# Patient Record
Sex: FEMALE | Race: OTHER | NOT HISPANIC OR LATINO | Employment: UNEMPLOYED | URBAN - METROPOLITAN AREA
[De-identification: names, ages, dates, MRNs, and addresses within clinical notes are randomized per-mention and may not be internally consistent; named-entity substitution may affect disease eponyms.]

---

## 2024-02-11 ENCOUNTER — APPOINTMENT (EMERGENCY)
Dept: CT IMAGING | Facility: HOSPITAL | Age: 34
DRG: 493 | End: 2024-02-11
Payer: COMMERCIAL

## 2024-02-11 ENCOUNTER — APPOINTMENT (EMERGENCY)
Dept: RADIOLOGY | Facility: HOSPITAL | Age: 34
DRG: 493 | End: 2024-02-11
Payer: COMMERCIAL

## 2024-02-11 ENCOUNTER — HOSPITAL ENCOUNTER (INPATIENT)
Facility: HOSPITAL | Age: 34
LOS: 3 days | Discharge: HOME | DRG: 493 | End: 2024-02-14
Attending: EMERGENCY MEDICINE | Admitting: INTERNAL MEDICINE
Payer: COMMERCIAL

## 2024-02-11 DIAGNOSIS — S82.201A CLOSED FRACTURE OF RIGHT TIBIA AND FIBULA, INITIAL ENCOUNTER: ICD-10-CM

## 2024-02-11 DIAGNOSIS — S82.401A CLOSED FRACTURE OF RIGHT TIBIA AND FIBULA, INITIAL ENCOUNTER: ICD-10-CM

## 2024-02-11 DIAGNOSIS — S82.231A CLOSED DISPLACED OBLIQUE FRACTURE OF SHAFT OF RIGHT TIBIA, INITIAL ENCOUNTER: Primary | ICD-10-CM

## 2024-02-11 PROBLEM — D72.823 LEUKEMOID REACTION: Status: ACTIVE | Noted: 2024-02-11

## 2024-02-11 LAB
ANION GAP SERPL CALCULATED.3IONS-SCNC: 12 MMOL/L
BASOPHILS # BLD AUTO: 0.04 THOUSANDS/ÂΜL (ref 0–0.1)
BASOPHILS NFR BLD AUTO: 0 % (ref 0–1)
BUN SERPL-MCNC: 20 MG/DL (ref 5–25)
CALCIUM SERPL-MCNC: 9.1 MG/DL (ref 8.4–10.2)
CHLORIDE SERPL-SCNC: 101 MMOL/L (ref 96–108)
CO2 SERPL-SCNC: 23 MMOL/L (ref 21–32)
CREAT SERPL-MCNC: 0.83 MG/DL (ref 0.6–1.3)
EOSINOPHIL # BLD AUTO: 0.01 THOUSAND/ÂΜL (ref 0–0.61)
EOSINOPHIL NFR BLD AUTO: 0 % (ref 0–6)
ERYTHROCYTE [DISTWIDTH] IN BLOOD BY AUTOMATED COUNT: 13.3 % (ref 11.6–15.1)
GFR SERPL CREATININE-BSD FRML MDRD: 93 ML/MIN/1.73SQ M
GLUCOSE SERPL-MCNC: 116 MG/DL (ref 65–140)
HCT VFR BLD AUTO: 35.7 % (ref 34.8–46.1)
HGB BLD-MCNC: 12.3 G/DL (ref 11.5–15.4)
IMM GRANULOCYTES # BLD AUTO: 0.06 THOUSAND/UL (ref 0–0.2)
IMM GRANULOCYTES NFR BLD AUTO: 0 % (ref 0–2)
LYMPHOCYTES # BLD AUTO: 2.73 THOUSANDS/ÂΜL (ref 0.6–4.47)
LYMPHOCYTES NFR BLD AUTO: 17 % (ref 14–44)
MCH RBC QN AUTO: 30 PG (ref 26.8–34.3)
MCHC RBC AUTO-ENTMCNC: 34.5 G/DL (ref 31.4–37.4)
MCV RBC AUTO: 87 FL (ref 82–98)
MONOCYTES # BLD AUTO: 0.91 THOUSAND/ÂΜL (ref 0.17–1.22)
MONOCYTES NFR BLD AUTO: 6 % (ref 4–12)
NEUTROPHILS # BLD AUTO: 12.02 THOUSANDS/ÂΜL (ref 1.85–7.62)
NEUTS SEG NFR BLD AUTO: 77 % (ref 43–75)
NRBC BLD AUTO-RTO: 0 /100 WBCS
PLATELET # BLD AUTO: 368 THOUSANDS/UL (ref 149–390)
PMV BLD AUTO: 9 FL (ref 8.9–12.7)
POTASSIUM SERPL-SCNC: 3.7 MMOL/L (ref 3.5–5.3)
PROCALCITONIN SERPL-MCNC: <0.05 NG/ML
RBC # BLD AUTO: 4.1 MILLION/UL (ref 3.81–5.12)
SODIUM SERPL-SCNC: 136 MMOL/L (ref 135–147)
WBC # BLD AUTO: 15.77 THOUSAND/UL (ref 4.31–10.16)

## 2024-02-11 PROCEDURE — 96374 THER/PROPH/DIAG INJ IV PUSH: CPT

## 2024-02-11 PROCEDURE — 99152 MOD SED SAME PHYS/QHP 5/>YRS: CPT | Performed by: EMERGENCY MEDICINE

## 2024-02-11 PROCEDURE — 29505 APPLICATION LONG LEG SPLINT: CPT | Performed by: EMERGENCY MEDICINE

## 2024-02-11 PROCEDURE — 99285 EMERGENCY DEPT VISIT HI MDM: CPT | Performed by: EMERGENCY MEDICINE

## 2024-02-11 PROCEDURE — 73700 CT LOWER EXTREMITY W/O DYE: CPT

## 2024-02-11 PROCEDURE — 85025 COMPLETE CBC W/AUTO DIFF WBC: CPT | Performed by: EMERGENCY MEDICINE

## 2024-02-11 PROCEDURE — 96376 TX/PRO/DX INJ SAME DRUG ADON: CPT

## 2024-02-11 PROCEDURE — 2W3LX1Z IMMOBILIZATION OF RIGHT LOWER EXTREMITY USING SPLINT: ICD-10-PCS | Performed by: EMERGENCY MEDICINE

## 2024-02-11 PROCEDURE — 99284 EMERGENCY DEPT VISIT MOD MDM: CPT

## 2024-02-11 PROCEDURE — 36415 COLL VENOUS BLD VENIPUNCTURE: CPT | Performed by: EMERGENCY MEDICINE

## 2024-02-11 PROCEDURE — 80048 BASIC METABOLIC PNL TOTAL CA: CPT | Performed by: EMERGENCY MEDICINE

## 2024-02-11 PROCEDURE — 73590 X-RAY EXAM OF LOWER LEG: CPT

## 2024-02-11 PROCEDURE — 99222 1ST HOSP IP/OBS MODERATE 55: CPT | Performed by: PHYSICIAN ASSISTANT

## 2024-02-11 PROCEDURE — 84145 PROCALCITONIN (PCT): CPT | Performed by: PHYSICIAN ASSISTANT

## 2024-02-11 RX ORDER — KETAMINE HCL IN NACL, ISO-OSM 100MG/10ML
15 SYRINGE (ML) INJECTION ONCE
Status: DISCONTINUED | OUTPATIENT
Start: 2024-02-11 | End: 2024-02-11

## 2024-02-11 RX ORDER — ACETAMINOPHEN 325 MG/1
650 TABLET ORAL EVERY 4 HOURS PRN
Status: DISCONTINUED | OUTPATIENT
Start: 2024-02-11 | End: 2024-02-14 | Stop reason: HOSPADM

## 2024-02-11 RX ORDER — FENTANYL CITRATE 50 UG/ML
25 INJECTION, SOLUTION INTRAMUSCULAR; INTRAVENOUS ONCE
Status: COMPLETED | OUTPATIENT
Start: 2024-02-11 | End: 2024-02-11

## 2024-02-11 RX ORDER — ACETAMINOPHEN 10 MG/ML
1000 INJECTION, SOLUTION INTRAVENOUS ONCE
Status: COMPLETED | OUTPATIENT
Start: 2024-02-11 | End: 2024-02-11

## 2024-02-11 RX ORDER — OXYCODONE HYDROCHLORIDE 10 MG/1
10 TABLET ORAL EVERY 4 HOURS PRN
Status: DISCONTINUED | OUTPATIENT
Start: 2024-02-11 | End: 2024-02-14 | Stop reason: HOSPADM

## 2024-02-11 RX ORDER — KETAMINE HCL IN NACL, ISO-OSM 100MG/10ML
25 SYRINGE (ML) INJECTION ONCE
Status: COMPLETED | OUTPATIENT
Start: 2024-02-11 | End: 2024-02-11

## 2024-02-11 RX ORDER — SODIUM CHLORIDE 9 MG/ML
125 INJECTION, SOLUTION INTRAVENOUS CONTINUOUS
Status: DISCONTINUED | OUTPATIENT
Start: 2024-02-12 | End: 2024-02-13

## 2024-02-11 RX ORDER — HYDROMORPHONE HCL/PF 1 MG/ML
1 SYRINGE (ML) INJECTION EVERY 4 HOURS PRN
Status: DISCONTINUED | OUTPATIENT
Start: 2024-02-11 | End: 2024-02-14 | Stop reason: HOSPADM

## 2024-02-11 RX ORDER — OXYCODONE HYDROCHLORIDE 5 MG/1
5 TABLET ORAL EVERY 4 HOURS PRN
Status: DISCONTINUED | OUTPATIENT
Start: 2024-02-11 | End: 2024-02-14 | Stop reason: HOSPADM

## 2024-02-11 RX ORDER — KETAMINE HCL IN NACL, ISO-OSM 100MG/10ML
10 SYRINGE (ML) INJECTION ONCE
Status: COMPLETED | OUTPATIENT
Start: 2024-02-11 | End: 2024-02-11

## 2024-02-11 RX ORDER — KETOROLAC TROMETHAMINE 30 MG/ML
15 INJECTION, SOLUTION INTRAMUSCULAR; INTRAVENOUS ONCE
Status: COMPLETED | OUTPATIENT
Start: 2024-02-11 | End: 2024-02-11

## 2024-02-11 RX ORDER — ONDANSETRON 2 MG/ML
4 INJECTION INTRAMUSCULAR; INTRAVENOUS EVERY 6 HOURS PRN
Status: DISCONTINUED | OUTPATIENT
Start: 2024-02-11 | End: 2024-02-14 | Stop reason: HOSPADM

## 2024-02-11 RX ORDER — HEPARIN SODIUM 5000 [USP'U]/ML
5000 INJECTION, SOLUTION INTRAVENOUS; SUBCUTANEOUS EVERY 8 HOURS SCHEDULED
Status: ACTIVE | OUTPATIENT
Start: 2024-02-11 | End: 2024-02-12

## 2024-02-11 RX ADMIN — SODIUM CHLORIDE 125 ML/HR: 0.9 INJECTION, SOLUTION INTRAVENOUS at 23:25

## 2024-02-11 RX ADMIN — KETOROLAC TROMETHAMINE 15 MG: 30 INJECTION, SOLUTION INTRAMUSCULAR at 21:28

## 2024-02-11 RX ADMIN — FENTANYL CITRATE 25 MCG: 50 INJECTION INTRAMUSCULAR; INTRAVENOUS at 20:04

## 2024-02-11 RX ADMIN — ACETAMINOPHEN 1000 MG: 10 INJECTION INTRAVENOUS at 21:28

## 2024-02-11 RX ADMIN — FENTANYL CITRATE 25 MCG: 50 INJECTION INTRAMUSCULAR; INTRAVENOUS at 19:26

## 2024-02-11 RX ADMIN — Medication 10 MG: at 21:50

## 2024-02-11 RX ADMIN — Medication 25 MG: at 21:34

## 2024-02-11 RX ADMIN — HYDROMORPHONE HYDROCHLORIDE 1 MG: 1 INJECTION, SOLUTION INTRAMUSCULAR; INTRAVENOUS; SUBCUTANEOUS at 23:44

## 2024-02-11 RX ADMIN — MORPHINE SULFATE 2 MG: 2 INJECTION, SOLUTION INTRAMUSCULAR; INTRAVENOUS at 23:00

## 2024-02-11 RX ADMIN — OXYCODONE HYDROCHLORIDE 10 MG: 10 TABLET ORAL at 21:02

## 2024-02-12 ENCOUNTER — APPOINTMENT (INPATIENT)
Dept: RADIOLOGY | Facility: HOSPITAL | Age: 34
DRG: 493 | End: 2024-02-12
Payer: COMMERCIAL

## 2024-02-12 ENCOUNTER — ANESTHESIA (INPATIENT)
Dept: PERIOP | Facility: HOSPITAL | Age: 34
DRG: 493 | End: 2024-02-12
Payer: COMMERCIAL

## 2024-02-12 ENCOUNTER — ANESTHESIA EVENT (INPATIENT)
Dept: PERIOP | Facility: HOSPITAL | Age: 34
DRG: 493 | End: 2024-02-12
Payer: COMMERCIAL

## 2024-02-12 PROBLEM — D64.9 ANEMIA: Status: ACTIVE | Noted: 2024-02-12

## 2024-02-12 LAB
ANION GAP SERPL CALCULATED.3IONS-SCNC: 7 MMOL/L
APTT PPP: 35 SECONDS (ref 23–37)
BASOPHILS # BLD AUTO: 0.01 THOUSANDS/ÂΜL (ref 0–0.1)
BASOPHILS NFR BLD AUTO: 0 % (ref 0–1)
BUN SERPL-MCNC: 15 MG/DL (ref 5–25)
CALCIUM SERPL-MCNC: 7.8 MG/DL (ref 8.4–10.2)
CHLORIDE SERPL-SCNC: 107 MMOL/L (ref 96–108)
CO2 SERPL-SCNC: 23 MMOL/L (ref 21–32)
CREAT SERPL-MCNC: 0.61 MG/DL (ref 0.6–1.3)
EOSINOPHIL # BLD AUTO: 0 THOUSAND/ÂΜL (ref 0–0.61)
EOSINOPHIL NFR BLD AUTO: 0 % (ref 0–6)
ERYTHROCYTE [DISTWIDTH] IN BLOOD BY AUTOMATED COUNT: 13.3 % (ref 11.6–15.1)
ERYTHROCYTE [DISTWIDTH] IN BLOOD BY AUTOMATED COUNT: 13.5 % (ref 11.6–15.1)
GFR SERPL CREATININE-BSD FRML MDRD: 120 ML/MIN/1.73SQ M
GLUCOSE SERPL-MCNC: 104 MG/DL (ref 65–140)
HCG SERPL QL: NEGATIVE
HCT VFR BLD AUTO: 31.3 % (ref 34.8–46.1)
HCT VFR BLD AUTO: 32.2 % (ref 34.8–46.1)
HGB BLD-MCNC: 10.3 G/DL (ref 11.5–15.4)
HGB BLD-MCNC: 10.8 G/DL (ref 11.5–15.4)
IMM GRANULOCYTES # BLD AUTO: 0.04 THOUSAND/UL (ref 0–0.2)
IMM GRANULOCYTES NFR BLD AUTO: 0 % (ref 0–2)
INR PPP: 1.06 (ref 0.84–1.19)
LYMPHOCYTES # BLD AUTO: 0.89 THOUSANDS/ÂΜL (ref 0.6–4.47)
LYMPHOCYTES NFR BLD AUTO: 8 % (ref 14–44)
MCH RBC QN AUTO: 29.2 PG (ref 26.8–34.3)
MCH RBC QN AUTO: 29.9 PG (ref 26.8–34.3)
MCHC RBC AUTO-ENTMCNC: 32.9 G/DL (ref 31.4–37.4)
MCHC RBC AUTO-ENTMCNC: 33.5 G/DL (ref 31.4–37.4)
MCV RBC AUTO: 89 FL (ref 82–98)
MCV RBC AUTO: 89 FL (ref 82–98)
MONOCYTES # BLD AUTO: 0.16 THOUSAND/ÂΜL (ref 0.17–1.22)
MONOCYTES NFR BLD AUTO: 1 % (ref 4–12)
NEUTROPHILS # BLD AUTO: 9.96 THOUSANDS/ÂΜL (ref 1.85–7.62)
NEUTS SEG NFR BLD AUTO: 91 % (ref 43–75)
NRBC BLD AUTO-RTO: 0 /100 WBCS
PLATELET # BLD AUTO: 281 THOUSANDS/UL (ref 149–390)
PLATELET # BLD AUTO: 296 THOUSANDS/UL (ref 149–390)
PMV BLD AUTO: 8.9 FL (ref 8.9–12.7)
PMV BLD AUTO: 9.1 FL (ref 8.9–12.7)
POTASSIUM SERPL-SCNC: 3.8 MMOL/L (ref 3.5–5.3)
PROCALCITONIN SERPL-MCNC: 0.08 NG/ML
PROTHROMBIN TIME: 14 SECONDS (ref 11.6–14.5)
RBC # BLD AUTO: 3.53 MILLION/UL (ref 3.81–5.12)
RBC # BLD AUTO: 3.61 MILLION/UL (ref 3.81–5.12)
SODIUM SERPL-SCNC: 137 MMOL/L (ref 135–147)
WBC # BLD AUTO: 11.06 THOUSAND/UL (ref 4.31–10.16)
WBC # BLD AUTO: 7.52 THOUSAND/UL (ref 4.31–10.16)

## 2024-02-12 PROCEDURE — 84145 PROCALCITONIN (PCT): CPT | Performed by: PHYSICIAN ASSISTANT

## 2024-02-12 PROCEDURE — C1713 ANCHOR/SCREW BN/BN,TIS/BN: HCPCS | Performed by: STUDENT IN AN ORGANIZED HEALTH CARE EDUCATION/TRAINING PROGRAM

## 2024-02-12 PROCEDURE — 27759 TREATMENT OF TIBIA FRACTURE: CPT | Performed by: STUDENT IN AN ORGANIZED HEALTH CARE EDUCATION/TRAINING PROGRAM

## 2024-02-12 PROCEDURE — 99232 SBSQ HOSP IP/OBS MODERATE 35: CPT | Performed by: INTERNAL MEDICINE

## 2024-02-12 PROCEDURE — 99255 IP/OBS CONSLTJ NEW/EST HI 80: CPT | Performed by: STUDENT IN AN ORGANIZED HEALTH CARE EDUCATION/TRAINING PROGRAM

## 2024-02-12 PROCEDURE — 27759 TREATMENT OF TIBIA FRACTURE: CPT

## 2024-02-12 PROCEDURE — 85025 COMPLETE CBC W/AUTO DIFF WBC: CPT

## 2024-02-12 PROCEDURE — 84703 CHORIONIC GONADOTROPIN ASSAY: CPT | Performed by: ANESTHESIOLOGY

## 2024-02-12 PROCEDURE — 80048 BASIC METABOLIC PNL TOTAL CA: CPT | Performed by: PHYSICIAN ASSISTANT

## 2024-02-12 PROCEDURE — 73590 X-RAY EXAM OF LOWER LEG: CPT

## 2024-02-12 PROCEDURE — 0QSG06Z REPOSITION RIGHT TIBIA WITH INTRAMEDULLARY INTERNAL FIXATION DEVICE, OPEN APPROACH: ICD-10-PCS | Performed by: STUDENT IN AN ORGANIZED HEALTH CARE EDUCATION/TRAINING PROGRAM

## 2024-02-12 PROCEDURE — 85610 PROTHROMBIN TIME: CPT | Performed by: PHYSICIAN ASSISTANT

## 2024-02-12 PROCEDURE — 85730 THROMBOPLASTIN TIME PARTIAL: CPT | Performed by: PHYSICIAN ASSISTANT

## 2024-02-12 PROCEDURE — 85027 COMPLETE CBC AUTOMATED: CPT | Performed by: PHYSICIAN ASSISTANT

## 2024-02-12 DEVICE — LOCKING SCREW FOR IM NAIL Ø 5MM/ 38MM/ XL25/ STERILE: Type: IMPLANTABLE DEVICE | Site: TIBIA | Status: FUNCTIONAL

## 2024-02-12 DEVICE — LOCKING SCREW FOR IM NAIL Ø 5MM/ 30MM/ XL25/ STERILE: Type: IMPLANTABLE DEVICE | Site: TIBIA | Status: FUNCTIONAL

## 2024-02-12 DEVICE — LOCKING SCREW FOR IM NAIL Ø 5MM/ 34MM/ XL25/ STERILE: Type: IMPLANTABLE DEVICE | Site: TIBIA | Status: FUNCTIONAL

## 2024-02-12 DEVICE — TIBIAL NAIL-ADVANCED / 9MM 315MM / STERILE: Type: IMPLANTABLE DEVICE | Site: TIBIA | Status: FUNCTIONAL

## 2024-02-12 RX ORDER — MIDAZOLAM HYDROCHLORIDE 2 MG/2ML
INJECTION, SOLUTION INTRAMUSCULAR; INTRAVENOUS AS NEEDED
Status: DISCONTINUED | OUTPATIENT
Start: 2024-02-12 | End: 2024-02-12

## 2024-02-12 RX ORDER — ONDANSETRON 2 MG/ML
4 INJECTION INTRAMUSCULAR; INTRAVENOUS ONCE AS NEEDED
Status: DISCONTINUED | OUTPATIENT
Start: 2024-02-12 | End: 2024-02-12 | Stop reason: HOSPADM

## 2024-02-12 RX ORDER — PROMETHAZINE HYDROCHLORIDE 25 MG/ML
12.5 INJECTION, SOLUTION INTRAMUSCULAR; INTRAVENOUS ONCE AS NEEDED
Status: DISCONTINUED | OUTPATIENT
Start: 2024-02-12 | End: 2024-02-12 | Stop reason: HOSPADM

## 2024-02-12 RX ORDER — CALCIUM CARBONATE 500 MG/1
1000 TABLET, CHEWABLE ORAL DAILY PRN
Status: DISCONTINUED | OUTPATIENT
Start: 2024-02-12 | End: 2024-02-14 | Stop reason: HOSPADM

## 2024-02-12 RX ORDER — ALBUTEROL SULFATE 2.5 MG/3ML
2.5 SOLUTION RESPIRATORY (INHALATION) ONCE AS NEEDED
Status: DISCONTINUED | OUTPATIENT
Start: 2024-02-12 | End: 2024-02-12 | Stop reason: HOSPADM

## 2024-02-12 RX ORDER — ACETAMINOPHEN 325 MG/1
975 TABLET ORAL EVERY 8 HOURS
Status: DISCONTINUED | OUTPATIENT
Start: 2024-02-12 | End: 2024-02-14 | Stop reason: HOSPADM

## 2024-02-12 RX ORDER — PROPOFOL 10 MG/ML
INJECTION, EMULSION INTRAVENOUS AS NEEDED
Status: DISCONTINUED | OUTPATIENT
Start: 2024-02-12 | End: 2024-02-12

## 2024-02-12 RX ORDER — LIDOCAINE HYDROCHLORIDE 10 MG/ML
INJECTION, SOLUTION EPIDURAL; INFILTRATION; INTRACAUDAL; PERINEURAL AS NEEDED
Status: DISCONTINUED | OUTPATIENT
Start: 2024-02-12 | End: 2024-02-12

## 2024-02-12 RX ORDER — DEXAMETHASONE SODIUM PHOSPHATE 10 MG/ML
INJECTION, SOLUTION INTRAMUSCULAR; INTRAVENOUS AS NEEDED
Status: DISCONTINUED | OUTPATIENT
Start: 2024-02-12 | End: 2024-02-12

## 2024-02-12 RX ORDER — MAGNESIUM HYDROXIDE 1200 MG/15ML
LIQUID ORAL AS NEEDED
Status: DISCONTINUED | OUTPATIENT
Start: 2024-02-12 | End: 2024-02-12 | Stop reason: HOSPADM

## 2024-02-12 RX ORDER — BUPIVACAINE HYDROCHLORIDE 2.5 MG/ML
INJECTION, SOLUTION EPIDURAL; INFILTRATION; INTRACAUDAL AS NEEDED
Status: DISCONTINUED | OUTPATIENT
Start: 2024-02-12 | End: 2024-02-12 | Stop reason: HOSPADM

## 2024-02-12 RX ORDER — CEFAZOLIN SODIUM 1 G/50ML
1000 SOLUTION INTRAVENOUS EVERY 8 HOURS
Status: COMPLETED | OUTPATIENT
Start: 2024-02-13 | End: 2024-02-13

## 2024-02-12 RX ORDER — SENNOSIDES 8.6 MG
1 TABLET ORAL DAILY
Status: DISCONTINUED | OUTPATIENT
Start: 2024-02-13 | End: 2024-02-14 | Stop reason: HOSPADM

## 2024-02-12 RX ORDER — ONDANSETRON 2 MG/ML
INJECTION INTRAMUSCULAR; INTRAVENOUS AS NEEDED
Status: DISCONTINUED | OUTPATIENT
Start: 2024-02-12 | End: 2024-02-12

## 2024-02-12 RX ORDER — ROCURONIUM BROMIDE 10 MG/ML
INJECTION, SOLUTION INTRAVENOUS AS NEEDED
Status: DISCONTINUED | OUTPATIENT
Start: 2024-02-12 | End: 2024-02-12

## 2024-02-12 RX ORDER — SODIUM CHLORIDE, SODIUM LACTATE, POTASSIUM CHLORIDE, CALCIUM CHLORIDE 600; 310; 30; 20 MG/100ML; MG/100ML; MG/100ML; MG/100ML
125 INJECTION, SOLUTION INTRAVENOUS CONTINUOUS
Status: DISCONTINUED | OUTPATIENT
Start: 2024-02-12 | End: 2024-02-13

## 2024-02-12 RX ORDER — FENTANYL CITRATE 50 UG/ML
INJECTION, SOLUTION INTRAMUSCULAR; INTRAVENOUS AS NEEDED
Status: DISCONTINUED | OUTPATIENT
Start: 2024-02-12 | End: 2024-02-12

## 2024-02-12 RX ORDER — KETAMINE HCL IN NACL, ISO-OSM 100MG/10ML
SYRINGE (ML) INJECTION AS NEEDED
Status: DISCONTINUED | OUTPATIENT
Start: 2024-02-12 | End: 2024-02-12

## 2024-02-12 RX ORDER — CEFAZOLIN SODIUM 1 G/50ML
SOLUTION INTRAVENOUS AS NEEDED
Status: DISCONTINUED | OUTPATIENT
Start: 2024-02-12 | End: 2024-02-12

## 2024-02-12 RX ORDER — FENTANYL CITRATE/PF 50 MCG/ML
50 SYRINGE (ML) INJECTION
Status: DISCONTINUED | OUTPATIENT
Start: 2024-02-12 | End: 2024-02-12 | Stop reason: HOSPADM

## 2024-02-12 RX ORDER — HYDROMORPHONE HCL/PF 1 MG/ML
0.5 SYRINGE (ML) INJECTION
Status: DISCONTINUED | OUTPATIENT
Start: 2024-02-12 | End: 2024-02-12 | Stop reason: HOSPADM

## 2024-02-12 RX ORDER — HYDROMORPHONE HCL/PF 1 MG/ML
SYRINGE (ML) INJECTION AS NEEDED
Status: DISCONTINUED | OUTPATIENT
Start: 2024-02-12 | End: 2024-02-12

## 2024-02-12 RX ADMIN — Medication 4 MCG: at 15:20

## 2024-02-12 RX ADMIN — FENTANYL CITRATE 50 MCG: 50 INJECTION INTRAMUSCULAR; INTRAVENOUS at 17:23

## 2024-02-12 RX ADMIN — Medication 20 MG: at 14:49

## 2024-02-12 RX ADMIN — MIDAZOLAM 2 MG: 1 INJECTION INTRAMUSCULAR; INTRAVENOUS at 14:42

## 2024-02-12 RX ADMIN — FENTANYL CITRATE 50 MCG: 50 INJECTION INTRAMUSCULAR; INTRAVENOUS at 14:42

## 2024-02-12 RX ADMIN — OXYCODONE HYDROCHLORIDE 10 MG: 10 TABLET ORAL at 01:09

## 2024-02-12 RX ADMIN — DEXAMETHASONE SODIUM PHOSPHATE 10 MG: 10 INJECTION, SOLUTION INTRAMUSCULAR; INTRAVENOUS at 14:49

## 2024-02-12 RX ADMIN — SUGAMMADEX 100 MG: 100 INJECTION, SOLUTION INTRAVENOUS at 16:40

## 2024-02-12 RX ADMIN — SODIUM CHLORIDE, SODIUM LACTATE, POTASSIUM CHLORIDE, AND CALCIUM CHLORIDE: .6; .31; .03; .02 INJECTION, SOLUTION INTRAVENOUS at 16:25

## 2024-02-12 RX ADMIN — PROPOFOL 80 MCG/KG/MIN: 10 INJECTION, EMULSION INTRAVENOUS at 14:53

## 2024-02-12 RX ADMIN — ACETAMINOPHEN 975 MG: 325 TABLET ORAL at 19:18

## 2024-02-12 RX ADMIN — Medication 4 MCG: at 15:00

## 2024-02-12 RX ADMIN — HYDROMORPHONE HYDROCHLORIDE 0.5 MG: 1 INJECTION, SOLUTION INTRAMUSCULAR; INTRAVENOUS; SUBCUTANEOUS at 16:31

## 2024-02-12 RX ADMIN — ONDANSETRON 4 MG: 2 INJECTION INTRAMUSCULAR; INTRAVENOUS at 16:22

## 2024-02-12 RX ADMIN — CEFAZOLIN SODIUM 1000 MG: 1 SOLUTION INTRAVENOUS at 14:43

## 2024-02-12 RX ADMIN — LIDOCAINE HYDROCHLORIDE 50 MG: 10 INJECTION, SOLUTION EPIDURAL; INFILTRATION; INTRACAUDAL; PERINEURAL at 14:48

## 2024-02-12 RX ADMIN — SODIUM CHLORIDE, SODIUM LACTATE, POTASSIUM CHLORIDE, AND CALCIUM CHLORIDE 125 ML/HR: .6; .31; .03; .02 INJECTION, SOLUTION INTRAVENOUS at 13:44

## 2024-02-12 RX ADMIN — SODIUM CHLORIDE 125 ML/HR: 0.9 INJECTION, SOLUTION INTRAVENOUS at 07:25

## 2024-02-12 RX ADMIN — FENTANYL CITRATE 50 MCG: 50 INJECTION INTRAMUSCULAR; INTRAVENOUS at 14:55

## 2024-02-12 RX ADMIN — ROCURONIUM BROMIDE 50 MG: 10 INJECTION, SOLUTION INTRAVENOUS at 14:49

## 2024-02-12 RX ADMIN — Medication 10 MG: at 15:20

## 2024-02-12 RX ADMIN — PROPOFOL 160 MG: 10 INJECTION, EMULSION INTRAVENOUS at 14:48

## 2024-02-12 NOTE — PROGRESS NOTES
Formerly [facility]  Progress Note  Name: Tonie Figueroa I  MRN: [id number]  Unit/Bed#: -01 I Date of Admission: 2/11/2024   Date of Service: 2/12/2024 I Hospital Day: 1    Assessment/Plan   * Tibia/fibula fracture, right, closed, initial encounter  Assessment & Plan  Presented following skiing injury for which she fell and twisted her long with subsequent severe pain  CT RLE (2/11): Oblique diaphyseal fracture of the distal tibia with half shaft width posterolateral displacement. Comminuted fracture of the proximal fibular diaphysis with posterior butterfly fragment. Major distal fragment shows half shaft width anterior displacement. No articular malalignment at the knee, proximal tibiofibular joint, or ankle.   Patient is acceptable risk to undergo surgical intervention  Continue pain control  Orthopedic surgery planning for intervention this afternoon    Leukemoid reaction  Assessment & Plan  Possibly reactive in nature due to fracture vs. Hemoconcentration  Resolved this AM following IVF resuscitation  Continue to monitor     Anemia  Assessment & Plan  No active bleeding noted; possible dilutional as all cell lines have decreased  Continue to monitor particularly post-operatively   Transfuse for < Hgb 7           VTE Pharmacologic Prophylaxis: VTE Score: 5  Received Heparin on arrival which is currently on hold for surgical intervention planned this afternoon    Mobility:          Patient Centered Rounds: I performed bedside rounds with nursing staff today.   Discussions with Specialists or Other Care Team Provider: Nursing and CM    Education and Discussions with Family / Patient: Updated  () at bedside.      Current Length of Stay: 1 day(s)  Current Patient Status: Inpatient   Certification Statement: The patient will continue to require additional inpatient hospital stay due to RLE fracture requiring surgical intervention.  Discharge Plan:  TBD following surgical  intervention and clinical progress.    Code Status: Level 1 - Full Code    Subjective:   Patient is resting comfortably in bed without any acute complaints. Pain is adequately managed at this time. No significant over night events reported by nursing.     Objective:     Vitals:   Temp (24hrs), Av.5 °F (36.9 °C), Min:98.4 °F (36.9 °C), Max:98.5 °F (36.9 °C)    Temp:  [98.4 °F (36.9 °C)-98.5 °F (36.9 °C)] 98.5 °F (36.9 °C)  HR:  [63-87] 63  Resp:  [17-18] 18  BP: ()/(64-89) 98/64  SpO2:  [92 %-100 %] 98 %  There is no height or weight on file to calculate BMI.     Input and Output Summary (last 24 hours):     Intake/Output Summary (Last 24 hours) at 2024 1019  Last data filed at 2024 0725  Gross per 24 hour   Intake 1100 ml   Output --   Net 1100 ml       Physical Exam:   Physical Exam  Vitals and nursing note reviewed.   Constitutional:       General: She is not in acute distress.  HENT:      Mouth/Throat:      Mouth: Mucous membranes are moist.      Pharynx: Oropharynx is clear.   Cardiovascular:      Rate and Rhythm: Normal rate and regular rhythm.      Pulses: Normal pulses.      Heart sounds: Normal heart sounds.   Pulmonary:      Effort: Pulmonary effort is normal. No respiratory distress.      Breath sounds: Normal breath sounds.   Musculoskeletal:      Comments: RLE in soft splint, hemodynamically intact   Skin:     Comments: Vitiligo   Neurological:      General: No focal deficit present.      Mental Status: She is alert. Mental status is at baseline.   Psychiatric:         Mood and Affect: Mood normal.         Behavior: Behavior normal.          Labs:  Results from last 7 days   Lab Units 24  0537 24  1932   WBC Thousand/uL 7.52 15.77*   HEMOGLOBIN g/dL 10.3* 12.3   HEMATOCRIT % 31.3* 35.7   PLATELETS Thousands/uL 296 368   NEUTROS PCT %  --  77*   LYMPHS PCT %  --  17   MONOS PCT %  --  6   EOS PCT %  --  0     Results from last 7 days   Lab Units 24  0537   SODIUM  mmol/L 137   POTASSIUM mmol/L 3.8   CHLORIDE mmol/L 107   CO2 mmol/L 23   BUN mg/dL 15   CREATININE mg/dL 0.61   ANION GAP mmol/L 7   CALCIUM mg/dL 7.8*   GLUCOSE RANDOM mg/dL 104     Results from last 7 days   Lab Units 02/12/24  0537   INR  1.06             Results from last 7 days   Lab Units 02/12/24  0537 02/11/24  1932   PROCALCITONIN ng/ml 0.08 <0.05       Lines/Drains:  Invasive Devices       Peripheral Intravenous Line  Duration             Peripheral IV 02/11/24 Left;Proximal;Ventral (anterior) Forearm <1 day                          Imaging: Reviewed radiology reports from this admission including: CT RLE    Recent Cultures (last 7 days):         Last 24 Hours Medication List:   Current Facility-Administered Medications   Medication Dose Route Frequency Provider Last Rate    acetaminophen  650 mg Oral Q4H PRN Tanya Vidal PA-C      HYDROmorphone  1 mg Intravenous Q4H PRN Tanya Vidal PA-C      ondansetron  4 mg Intravenous Q6H PRN MICHAEL Su-C      oxyCODONE  10 mg Oral Q4H PRN Tanya Vidal, PA-C      oxyCODONE  5 mg Oral Q4H PRN Tanya Vidal PA-C      sodium chloride  125 mL/hr Intravenous Continuous Tanya Vidal PA-C 125 mL/hr (02/12/24 0725)        Today, Patient Was Seen By: Jessica Peguero DO    **Please Note: This note may have been constructed using a voice recognition system.**

## 2024-02-12 NOTE — OP NOTE
OPERATIVE REPORT    PATIENT NAME: Tonie Figueroa   :  1990  MRN: [id number]  Pt Location: [facility] OR ROOM 02    SURGERY DATE: 2024    SURGEON(S) and ROLE:  Primary: Jun Jolley DO  Assisting: Fabio Sofia PA-C    NOTE:  The presence of a physician assistant was necessary to help with patient positioning, surgical exposure, wound retraction, wound closure, and other key portions of the procedure.  No qualified resident was available for this case.      PREOPERATIVE DIAGNOSES:  Right comminuted tibial shaft fracture    POSTOPERATIVE DIAGNOSES:  Right comminuted tibial shaft fracture    PROCEDURES:  Right tibia insertion of intramedullary nail      ANESTHESIA TYPE:  General endotracheal    ANESTHESIA STAFF:   Anesthesiologist: Zeke Briceño MD; Shiloh Solomon DO  CRNA: Veronica Nova CRNA    ESTIMATED BLOOD LOSS:  50 mL    TOURNIQUET TIME: None    PERIOPERATIVE ANTIBIOTICS:  cefazolin, 1 gram    IMPLANTS: Synthes tibial nail, 315 mm x 9 mm    Implant Name Type Inv. Item Serial No.  Lot No. LRB No. Used Action   NAIL IM 9 X 315MM STRL - [id number]  NAIL IM 9 X 315MM STRL  Synthes [id number] Right 1 Implanted   SCREW RETAIING LCK 5 X 34MM F.IM NAIL [id number] STRL - [id number]  SCREW RETAIING LCK 5 X 34MM F.IM NAIL [id number] STRL  Synthes [id number] Right 1 Implanted   SCREW RETAIING LCK 5 X 34MM F.IM NAIL W/[id number] STRL - [id number]  SCREW RETAIING LCK 5 X 34MM F.IM NAIL [id number] STRL  Synthes [id number] Right 1 Implanted   SCREW RETAIING LCK 5 X 38MM F.IM NAIL W/[id number] STRL - [id number]  SCREW RETAIING LCK 5 X 38MM F.IM NAIL [id number] STRL  Synthes [id number] Right 1 Implanted   SCREW RETAIING LCK 5 X 30MM F.IM NAIL W/[id number] STRL - [id number]  SCREW RETAIING LCK 5 X 30MM F.IM NAIL W/[id number] STRL  Synthes [id number] Right 1 Implanted       SPECIMENS:  * No specimens in log *    DRAINS:  None      OPERATIVE INDICATIONS:  The patient is a 33 y.o. female who sustained a  injury to her right tibia while skiing.  Taken to the emergency room where she was diagnosed with a comminuted tibial shaft fracture.  Surgical treatment was indicated due to persistent symptoms despite non-surgical treatment, instability, displacement, comminution, elevated risk of developing nonunion, and liklihood of prolonged or permanent functional impairment with non-surgical treatment.  After a thorough discussion of the potential risks, benefits, and alternative treatments, the patient agreed to proceed with surgery.  The patient understands that the risks of surgery include, but are not limited to: failure of repair, nonunion, malunion, loss of fixation, infection, neurovascular injury, wound healing complications, venous thromboembolism, persistent pain, stiffness, instability, recurrence of symptoms, potential need for additional surgeries, and loss of limb or life.  Oral and written consent for surgery was obtained from the patient preoperatively.    PROCEDURE AND TECHNIQUE:  On the day of surgery, the patient was identified by myself in the preoperative holding area.  The operative site was marked by the surgeon as the proper operative extremity.  they were taken to the operating room , transferred operating room table, placed in the supine position with all bony prominences well-padded.  Bone foam was placed underneath the right lower extremity along with a bump.  The patient was anesthetized, intubated and sedated in the standard manner and perioperative antibiotics were given.  The operative site was prepped and draped using standard sterile technique.  A time-out was conducted to confirm the patient's identity, identifying all team members in the room, the operative site, and the proposed procedure.     Approximately 4 cm incision was made proximal to the patella centrally.  Skin was sharply incised down to the overlying quadriceps fascia.  Quadriceps tendon was incised sharply down to bone.  Blunt  deep dissection was performed with fingers dissection and sweeping any adhesions around the patella and into the intra-articular space.  Suprapatellar soft trocar was placed underneath the patella down to the proximal tibia.  Opening guidewire was then placed through the trocar.  Fluoroscopy was performed demonstrating the starting guidewire to be just medial to the lateral tibial spine and just anterior to the articular surface on AP and lateral views.  This was inserted into the intramedullary space in the appropriate manner.  Opening reamer was then placed through the trocar protector and opening reamer was performed under fluoroscopic guidance.  Long ball-tipped guidewire was then placed within the intramedullary space.  Fracture was manually reduced with traction and manual compression at the fracture site.  Near-anatomic reduction was maintained.  Guidewire was passed across the fracture site deemed to be within the center of the ankle on both AP and lateral views.  Sequential reaming was then performed in standard manner up to a size 10-1/2 reamer.  Patient had very tight cortices as she is a small female.  Decided use a size 315 x 9 mm tibial nail.  This was inserted maintaining a proper orientation and reduction of the shaft fracture.  Nail was safely inserted within the intramedullary space over the guidewire maintaining appropriate reduction of the fracture.  Long ball-tipped guidewire was removed, jig was placed over the arm and the nail.  2 proximal screws were then placed from medial to lateral through the jig in the standard manner.  Manual compression was then performed with a heel strike and anatomic reduction the lateral cortex was performed.  There is a small area of medial comminution.  Fracture reduction was near-anatomic on both AP and lateral views.  Perfect [place] technique was then performed to put 2 distal locking screws.  Final fluoroscopic imaging was taken demonstrating near-anatomic  reduction of the fracture intramedullary placement of the tibial nail and the nail being completely extra-articular proximally.  Jig was removed.  The intra-articular space was copiously irrigated with 2 L of pulse lavage normal saline.  Small incisions were also irrigated distally.  Quadriceps tendon was closed with #1 Vicryl deep tissue closed with #1 Vicryl, subcutaneous tissue closed with 2-0 Monocryl, skin closed with running 3-0 subcuticular stitch.  Skin was cleansed and dried, skin glue was placed.  Xeroform 4 x 4's soft roll posterior splint and Ace wrap were then placed.      Patient was awoken from anesthesia without complication sent to the PACU     I was present for the entire procedure., A qualified resident physician was not available. and A physician assistant was required during the procedure for retraction, tissue handling, dissection and suturing.      COMPLICATIONS:  None    PATIENT DISPOSITION:  PACU  and extubated and stable    Plan:  WBAT to RLE  Weightbearing as tolerated in posterior splint with postop shoe  PT/OT  DVT PPX: ASA 81mg BID x 4 weeks  Drain: None  Ancef x2  Multimodal pain control  Must follow-up with home orthopedic surgeon in 10 to 14 days    SIGNATURE:  Jun Jolley, DO  DATE:  February 12, 2024  TIME:  4:47 PM

## 2024-02-12 NOTE — UTILIZATION REVIEW
Initial Clinical Review    Admission: Date/Time/Statement:   Admission Orders (From admission, onward)       Ordered        02/11/24 2039  INPATIENT ADMISSION  Once                          Orders Placed This Encounter   Procedures    INPATIENT ADMISSION     Standing Status:   Standing     Number of Occurrences:   1     Order Specific Question:   Level of Care     Answer:   Med Surg [16]     Order Specific Question:   Estimated length of stay     Answer:   More than 2 Midnights     Order Specific Question:   Certification     Answer:   I certify that inpatient services are medically necessary for this patient for a duration of greater than two midnights. See H&P and MD Progress Notes for additional information about the patient's course of treatment.     ED Arrival Information       Expected   -    Arrival   2/11/2024 18:54    Acuity   Urgent              Means of arrival   Ambulance    Escorted by   [facility]    Service   Hospitalist    Admission type   Emergency              Arrival complaint   leg injury             Chief Complaint   Patient presents with    Leg Injury       Initial Presentation: 33 y.o. female with no significant PMHx who presents with skiing injury twisted leg. Patient was skiing and fell, the ski did not come off and her leg twisted. Severe pain, 10/10. Pain med did give some relief. Plan: Inpatient admission for evaluation and treatment of right closed tibia/fibula fracture: NPO, Ortho consult, pain control.     Date: 2/12   Day 2:     Internal medicine: Patient is acceptable risk to undergo surgical intervention. Pain control. Orthopedic surgery planning for intervention this afternoon.     Ortho consult: NWB to RLE, pain management, NPO, to OR for operative fixation of tibial shaft fracture.    PROCEDURES:  Right tibia insertion of intramedullary nail    ED Triage Vitals   Temperature Pulse Respirations Blood Pressure SpO2   02/11/24 1901 02/11/24 1859 02/11/24 1859 02/11/24 1859  02/11/24 1859   98.4 °F (36.9 °C) 78 18 117/64 100 %      Temp Source Heart Rate Source Patient Position - Orthostatic VS BP Location FiO2 (%)   02/11/24 1901 02/11/24 1859 02/11/24 1859 02/11/24 1859 --   Oral Monitor Sitting Left arm       Pain Score       02/11/24 1859       10 - Worst Possible Pain          Wt Readings from Last 1 Encounters:   02/11/24 54.8 kg (120 lb 13 oz)     Additional Vital Signs:     Date/Time Temp Pulse Resp BP MAP (mmHg) SpO2 O2 Device   02/12/24 08:02:52 98.5 °F (36.9 °C) 63 18 98/64 75 98 % --   02/12/24 0720 -- -- -- -- -- 96 % None (Room air)   02/11/24 23:13:38 -- 68 17 123/76 92 98 % --   02/11/24 2230 -- 79 18 122/70 92 96 % --   02/11/24 2220 -- 87 18 121/80 95 97 % --   02/11/24 2210 -- 81 18 130/81 101 98 % --   02/11/24 2200 -- 83 18 129/89 104 97 % --   02/11/24 2150 -- 87 18 146/81 107 92 % --   02/11/24 2140 -- 87 18 144/87 109 100 % --   02/11/24 1901 98.4 °F (36.9 °C) -- -- -- -- -- --     Pertinent Labs/Diagnostic Test Results:   CT lower extremity wo contrast right   Final Result by Ceferino Cisneros MD (02/11 2130)      Oblique diaphyseal fracture of the distal tibia with half shaft width posterolateral displacement.      Comminuted fracture of the proximal fibular diaphysis with posterior butterfly fragment. Major distal fragment shows half shaft width anterior displacement.      No articular malalignment at the knee, proximal tibiofibular joint, or ankle.         Workstation performed: [id number]         XR tibia fibula 2 views RIGHT   ED Interpretation by Andrade Hutchinson MD (02/11 2154)      Oblique diaphyseal fracture of the distal tibia with half shaft width posterolateral displacement.      Comminuted fracture of the proximal fibular diaphysis with posterior butterfly fragment. Major distal fragment shows half shaft width anterior displacement.      No articular malalignment at the knee, proximal tibiofibular joint, or ankle.         Workstation performed:  [id number]         Final Result by Ceferino Cisneros MD (02/11 2130)      Oblique diaphyseal fracture of the distal tibia with half shaft width posterolateral displacement.      Comminuted fracture of the proximal fibular diaphysis with posterior butterfly fragment. Major distal fragment shows half shaft width anterior displacement.      No articular malalignment at the knee, proximal tibiofibular joint, or ankle.         Workstation performed: [id number]               Results from last 7 days   Lab Units 02/12/24  0537 02/11/24 1932   WBC Thousand/uL 7.52 15.77*   HEMOGLOBIN g/dL 10.3* 12.3   HEMATOCRIT % 31.3* 35.7   PLATELETS Thousands/uL 296 368   NEUTROS ABS Thousands/µL  --  12.02*         Results from last 7 days   Lab Units 02/12/24  0537 02/11/24 1932   SODIUM mmol/L 137 136   POTASSIUM mmol/L 3.8 3.7   CHLORIDE mmol/L 107 101   CO2 mmol/L 23 23   ANION GAP mmol/L 7 12   BUN mg/dL 15 20   CREATININE mg/dL 0.61 0.83   EGFR ml/min/1.73sq m 120 93   CALCIUM mg/dL 7.8* 9.1             Results from last 7 days   Lab Units 02/12/24  0537 02/11/24  1932   GLUCOSE RANDOM mg/dL 104 116           Results from last 7 days   Lab Units 02/12/24 0537   PROTIME seconds 14.0   INR  1.06   PTT seconds 35         Results from last 7 days   Lab Units 02/12/24  0537 02/11/24  1932   PROCALCITONIN ng/ml 0.08 <0.05         ED Treatment:   Medication Administration from 02/11/2024 1853 to 02/11/2024 2258         Date/Time Order Dose Route Action     02/11/2024 1926 EST fentanyl citrate (PF) 100 MCG/2ML 25 mcg 25 mcg Intravenous Given     02/11/2024 2004 EST fentanyl citrate (PF) 100 MCG/2ML 25 mcg 25 mcg Intravenous Given     02/11/2024 2102 EST oxyCODONE (ROXICODONE) immediate release tablet 10 mg 10 mg Oral Given     02/11/2024 2128 EST ketorolac (TORADOL) injection 15 mg 15 mg Intravenous Given     02/11/2024 2128 EST acetaminophen (Ofirmev) injection 1,000 mg 1,000 mg Intravenous New Bag     02/11/2024 2134 EST Ketamine HCl  25 mg 25 mg Intravenous Given     02/11/2024 2150 EST Ketamine HCl 10 mg 10 mg Intravenous Given          History reviewed. No pertinent past medical history.  Present on Admission:   Tibia/fibula fracture, right, closed, initial encounter   Leukemoid reaction      Admitting Diagnosis: Leg injury [S89.90XA]  Closed displaced oblique fracture of shaft of right tibia, initial encounter [S82.231A]  Closed fracture of right tibia and fibula, initial encounter [S82.201A, S82.401A]  Age/Sex: 33 y.o. female  Admission Orders:  Scheduled Medications:     Continuous IV Infusions:  sodium chloride, 125 mL/hr, Intravenous, Continuous      PRN Meds:  acetaminophen, 650 mg, Oral, Q4H PRN  HYDROmorphone, 1 mg, Intravenous, Q4H PRN  ondansetron, 4 mg, Intravenous, Q6H PRN  oxyCODONE, 10 mg, Oral, Q4H PRN  oxyCODONE, 5 mg, Oral, Q4H PRN        IP CONSULT TO ORTHOPEDIC SURGERY    Network Utilization Review Department  ATTENTION: Please call with any questions or concerns to [phone] and carefully listen to the prompts so that you are directed to the right person. All voicemails are confidential.   For Discharge needs, contact Care Management DC Support Team at [phone] opt. [phone]  Send all requests for admission clinical reviews, approved or denied determinations and any other requests to dedicated fax number below belonging to the campus where the patient is receiving treatment. List of dedicated fax numbers for the Facilities:  FACILITY NAME UR FAX NUMBER   ADMISSION DENIALS (Administrative/Medical Necessity) [phone]   DISCHARGE SUPPORT TEAM (NETWORK) [phone]   PARENT CHILD HEALTH (Maternity/NICU/Pediatrics) [phone]   [facility] [phone]   [facility] [phone]   Replaced by [facility] [phone]   [facility] [phone]   [facility] [phone]   [facility]  [phone]   [facility] [phone]   [facility] [phone]   [facility] [phone]   [facility] [phone]   [facility] [phone]   [facility] [phone]

## 2024-02-12 NOTE — ASSESSMENT & PLAN NOTE
Presented following skiing injury for which she fell and twisted her long with subsequent severe pain  CT RLE (2/11): Oblique diaphyseal fracture of the distal tibia with half shaft width posterolateral displacement. Comminuted fracture of the proximal fibular diaphysis with posterior butterfly fragment. Major distal fragment shows half shaft width anterior displacement. No articular malalignment at the knee, proximal tibiofibular joint, or ankle.   Patient is acceptable risk to undergo surgical intervention  Continue pain control  Orthopedic surgery planning for intervention this afternoon

## 2024-02-12 NOTE — ANESTHESIA POSTPROCEDURE EVALUATION
Post-Op Assessment Note    CV Status:  Stable    Pain management: satisfactory to patient       Mental Status:  Sleepy and arousable   Hydration Status:  Euvolemic   PONV Controlled:  None   Airway Patency:  Patent     Post Op Vitals Reviewed: Yes    No anethesia notable event occurred.    Staff: CRNA, Anesthesiologist               /55 (02/12/24 1704)    Temp      Pulse 66 (02/12/24 1704)   Resp 16 (02/12/24 1704)    SpO2 98 % (02/12/24 1704)

## 2024-02-12 NOTE — PLAN OF CARE
Problem: PAIN - ADULT  Goal: Verbalizes/displays adequate comfort level or baseline comfort level  Description: Interventions:  - Encourage patient to monitor pain and request assistance  - Assess pain using appropriate pain scale  - Administer analgesics based on type and severity of pain and evaluate response  - Implement non-pharmacological measures as appropriate and evaluate response  - Consider cultural and social influences on pain and pain management  - Notify physician/advanced practitioner if interventions unsuccessful or patient reports new pain  Outcome: Progressing     Problem: INFECTION - ADULT  Goal: Absence or prevention of progression during hospitalization  Description: INTERVENTIONS:  - Assess and monitor for signs and symptoms of infection  - Monitor lab/diagnostic results  - Monitor all insertion sites, i.e. indwelling lines, tubes, and drains  - Monitor endotracheal if appropriate and nasal secretions for changes in amount and color  - [place] appropriate cooling/warming therapies per order  - Administer medications as ordered  - Instruct and encourage patient and family to use good hand hygiene technique  - Identify and instruct in appropriate isolation precautions for identified infection/condition  Outcome: Progressing     Problem: Knowledge Deficit  Goal: Patient/family/caregiver demonstrates understanding of disease process, treatment plan, medications, and discharge instructions  Description: Complete learning assessment and assess knowledge base.  Interventions:  - Provide teaching at level of understanding  - Provide teaching via preferred learning methods  Outcome: Progressing
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
## 2024-02-12 NOTE — DISCHARGE INSTR - AVS FIRST PAGE
Dr. Jolley Fracture Fixation      What to Expect/Activity  It is normal to have some discomfort in the surgical area for several days.  For the next 48 hours use ice packs around your surgical limb,  20-30 minutes every 1-2 hours while you are awake.  Elevate your surgical leg by elevating with pillows as much as possible  You are weight bearing as tolerated to your operative extremity in your splint and with a postop shoe to protect the splint    Dressing/Wound Care/Bathing  Do not remove your splint or dressing  Do not stick anything down your splint to itch. Benadryl, ice and light pressure through the splint can help alleviate itching.  When showering please wrap the splint in either a cast bag or trash bag secured to prevent the splint from getting wet. Please call the office if the splint becomes wet or dirty.  No baths, swimming or submerging until discussed at your follow-up appointment.     Pain Management/Medications  You may resume your usual home medications unless instructed otherwise.  You have been prescribed several medications. Take as directed on the instructions. If you experience any adverse effects, stop taking them immediately and call the office for additional instructions.   Tylenol: 1000 mg every 8 hours for mild/moderate pain  Aspirin 81 mg twice daily for 4 weeks (this is to prevent blood clots after surgery.)  Narcotic pain medication: You may have been prescribed a strong narcotic medication. Take this according to the pharmacy instructions.   Stool Softeners (senna/colace): take daily while taking narcotic pain medication to help prevent constipation     Follow up/Call if:  The findings of your surgery will be explained to you and your family immediately after surgery. However, in the post-operative period, during recovery from anesthesia you may not fully remember or fully understand what was said. This will be explained once again when you return for your post-op appointment.    You should call to schedule a post-operative appointment in the office 10-14 days from the date of surgery if not already scheduled.   If you experience fever over 101 degrees, excessive bleeding, persistent nausea or vomiting, decreased sensation or discoloration of the operative leg, or severe uncontrollable pain please contact Dr. Jolley's office immediately.     Dr. Jolley's Office Contact: [phone]

## 2024-02-12 NOTE — ANESTHESIA PREPROCEDURE EVALUATION
Procedure:  INSERTION NAIL IM TIBIA (Right: Leg Lower)    Relevant Problems   HEMATOLOGY   (+) Anemia      Musculoskeletal and Integument   (+) Tibia/fibula fracture, right, closed, initial encounter      Confirmed NPO appropriate    Physical Exam    Airway    Mallampati score: II  TM Distance: >3 FB  Neck ROM: full     Dental   No notable dental hx     Cardiovascular      Pulmonary      Other Findings  post-pubertal.      Anesthesia Plan  ASA Score- 1     Anesthesia Type- general with ASA Monitors.         Additional Monitors:     Airway Plan: ETT.           Plan Factors-Exercise tolerance (METS): >4 METS.Exercise comment: Injury sustained while skiing.    Chart reviewed.   Existing labs reviewed.     Patient is not a current smoker.  Patient did not smoke on day of surgery.            Induction- intravenous.    Postoperative Plan- Plan for postoperative opioid use. Planned trial extubation    Informed Consent- Anesthetic plan and risks discussed with patient.
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
## 2024-02-12 NOTE — PLAN OF CARE
Problem: Prexisting or High Potential for Compromised Skin Integrity  Goal: Skin integrity is maintained or improved  Description: INTERVENTIONS:  - Identify patients at risk for skin breakdown  - Assess and monitor skin integrity  - Assess and monitor nutrition and hydration status  - Monitor labs   - Assess for incontinence   - Turn and reposition patient  - Assist with mobility/ambulation  - Relieve pressure over bony prominences  - Avoid friction and shearing  - Provide appropriate hygiene as needed including keeping skin clean and dry  - Evaluate need for skin moisturizer/barrier cream  - Collaborate with interdisciplinary team   - Patient/family teaching  - Consider wound care consult   2/12/2024 0043 by Padmini Nelson RN  Outcome: Progressing  2/12/2024 0042 by Padmini Nelson RN  Outcome: Progressing     Problem: PAIN - ADULT  Goal: Verbalizes/displays adequate comfort level or baseline comfort level  Description: Interventions:  - Encourage patient to monitor pain and request assistance  - Assess pain using appropriate pain scale  - Administer analgesics based on type and severity of pain and evaluate response  - Implement non-pharmacological measures as appropriate and evaluate response  - Consider cultural and social influences on pain and pain management  - Notify physician/advanced practitioner if interventions unsuccessful or patient reports new pain  2/12/2024 0043 by Padmini Nelson RN  Outcome: Progressing  2/12/2024 0042 by Padmini Nelson RN  Outcome: Progressing     Problem: INFECTION - ADULT  Goal: Absence or prevention of progression during hospitalization  Description: INTERVENTIONS:  - Assess and monitor for signs and symptoms of infection  - Monitor lab/diagnostic results  - Monitor all insertion sites, i.e. indwelling lines, tubes, and drains  - Monitor endotracheal if appropriate and nasal secretions for changes in amount and color  - Ava appropriate cooling/warming therapies per  order  - Administer medications as ordered  - Instruct and encourage patient and family to use good hand hygiene technique  - Identify and instruct in appropriate isolation precautions for identified infection/condition  2/12/2024 0043 by Padmini Nelson RN  Outcome: Progressing  2/12/2024 0042 by Padmini Nelson RN  Outcome: Progressing  Goal: Absence of fever/infection during neutropenic period  Description: INTERVENTIONS:  - Monitor WBC    2/12/2024 0043 by Padmini Nelson RN  Outcome: Progressing  2/12/2024 0042 by Padmini Nelson RN  Outcome: Progressing     Problem: SAFETY ADULT  Goal: Patient will remain free of falls  Description: INTERVENTIONS:  - Educate patient/family on patient safety including physical limitations  - Instruct patient to call for assistance with activity   - Consult OT/PT to assist with strengthening/mobility   - Keep Call bell within reach  - Keep bed low and locked with side rails adjusted as appropriate  - Keep care items and personal belongings within reach  - Initiate and maintain comfort rounds  - Make Fall Risk Sign visible to staff  - Offer Toileting every 2 Hours, in advance of need  - Initiate/Maintain bed alarm  - Obtain necessary fall risk management equipment:   - Apply yellow socks and bracelet for high fall risk patients  - Consider moving patient to room near nurses station  2/12/2024 0043 by Padmini Nelson RN  Outcome: Progressing  2/12/2024 0042 by Padmini Nelson RN  Outcome: Progressing  Goal: Maintain or return to baseline ADL function  Description: INTERVENTIONS:  -  Assess patient's ability to carry out ADLs; assess patient's baseline for ADL function and identify physical deficits which impact ability to perform ADLs (bathing, care of mouth/teeth, toileting, grooming, dressing, etc.)  - Assess/evaluate cause of self-care deficits   - Assess range of motion  - Assess patient's mobility; develop plan if impaired  - Assess patient's need for assistive devices and provide  as appropriate  - Encourage maximum independence but intervene and supervise when necessary  - Involve family in performance of ADLs  - Assess for home care needs following discharge   - Consider OT consult to assist with ADL evaluation and planning for discharge  - Provide patient education as appropriate  2/12/2024 0043 by Padmini Nelson RN  Outcome: Progressing  2/12/2024 0042 by Padmini Nelson RN  Outcome: Progressing  Goal: Maintains/Returns to pre admission functional level  Description: INTERVENTIONS:  - Perform AM-PAC 6 Click Basic Mobility/ Daily Activity assessment daily.  - Set and communicate daily mobility goal to care team and patient/family/caregiver.   - Collaborate with rehabilitation services on mobility goals if consulted  - Perform Range of Motion 2 times a day.  - Reposition patient every 2 hours.  - Dangle patient 2 times a day  - Stand patient 2 times a day  - Ambulate patient 2 times a day  - Out of bed to chair 2 times a day   - Out of bed for meals 2 times a day  - Out of bed for toileting  - Record patient progress and toleration of activity level   2/12/2024 0043 by Padmini Nelson RN  Outcome: Progressing  2/12/2024 0042 by Padmini Nelosn RN  Outcome: Progressing     Problem: DISCHARGE PLANNING  Goal: Discharge to home or other facility with appropriate resources  Description: INTERVENTIONS:  - Identify barriers to discharge w/patient and caregiver  - Arrange for needed discharge resources and transportation as appropriate  - Identify discharge learning needs (meds, wound care, etc.)  - Arrange for interpretive services to assist at discharge as needed  - Refer to Case Management Department for coordinating discharge planning if the patient needs post-hospital services based on physician/advanced practitioner order or complex needs related to functional status, cognitive ability, or social support system  2/12/2024 0043 by Padmini Nelson RN  Outcome: Progressing  2/12/2024 0042 by Padmini PIERRE  INGRID Nelson  Outcome: Progressing     Problem: Knowledge Deficit  Goal: Patient/family/caregiver demonstrates understanding of disease process, treatment plan, medications, and discharge instructions  Description: Complete learning assessment and assess knowledge base.  Interventions:  - Provide teaching at level of understanding  - Provide teaching via preferred learning methods  2/12/2024 0043 by Padmini Nelson RN  Outcome: Progressing  2/12/2024 0042 by Padmini Nelson RN  Outcome: Progressing     Problem: Prexisting or High Potential for Compromised Skin Integrity  Goal: Skin integrity is maintained or improved  Description: INTERVENTIONS:  - Identify patients at risk for skin breakdown  - Assess and monitor skin integrity  - Assess and monitor nutrition and hydration status  - Monitor labs   - Assess for incontinence   - Turn and reposition patient  - Assist with mobility/ambulation  - Relieve pressure over bony prominences  - Avoid friction and shearing  - Provide appropriate hygiene as needed including keeping skin clean and dry  - Evaluate need for skin moisturizer/barrier cream  - Collaborate with interdisciplinary team   - Patient/family teaching  - Consider wound care consult   2/12/2024 0043 by Padmini Nelson RN  Outcome: Progressing  2/12/2024 0042 by Padmini Nelson RN  Outcome: Progressing     Problem: PAIN - ADULT  Goal: Verbalizes/displays adequate comfort level or baseline comfort level  Description: Interventions:  - Encourage patient to monitor pain and request assistance  - Assess pain using appropriate pain scale  - Administer analgesics based on type and severity of pain and evaluate response  - Implement non-pharmacological measures as appropriate and evaluate response  - Consider cultural and social influences on pain and pain management  - Notify physician/advanced practitioner if interventions unsuccessful or patient reports new pain  2/12/2024 0043 by Padmini Nelson RN  Outcome:  Progressing  2/12/2024 0042 by Padmini Nelson RN  Outcome: Progressing     Problem: INFECTION - ADULT  Goal: Absence or prevention of progression during hospitalization  Description: INTERVENTIONS:  - Assess and monitor for signs and symptoms of infection  - Monitor lab/diagnostic results  - Monitor all insertion sites, i.e. indwelling lines, tubes, and drains  - Monitor endotracheal if appropriate and nasal secretions for changes in amount and color  - Keeler appropriate cooling/warming therapies per order  - Administer medications as ordered  - Instruct and encourage patient and family to use good hand hygiene technique  - Identify and instruct in appropriate isolation precautions for identified infection/condition  2/12/2024 0043 by Padmini Nelson RN  Outcome: Progressing  2/12/2024 0042 by Padmini Nelson RN  Outcome: Progressing  Goal: Absence of fever/infection during neutropenic period  Description: INTERVENTIONS:  - Monitor WBC    2/12/2024 0043 by Padmini Nelson RN  Outcome: Progressing  2/12/2024 0042 by Padmini Neslon RN  Outcome: Progressing     Problem: SAFETY ADULT  Goal: Patient will remain free of falls  Description: INTERVENTIONS:  - Educate patient/family on patient safety including physical limitations  - Instruct patient to call for assistance with activity   - Consult OT/PT to assist with strengthening/mobility   - Keep Call bell within reach  - Keep bed low and locked with side rails adjusted as appropriate  - Keep care items and personal belongings within reach  - Initiate and maintain comfort rounds  - Make Fall Risk Sign visible to staff  - Offer Toileting every 2 Hours, in advance of need  - Initiate/Maintain bed alarm  - Obtain necessary fall risk management equipment:   - Apply yellow socks and bracelet for high fall risk patients  - Consider moving patient to room near nurses station  2/12/2024 0043 by Padmini Nelson RN  Outcome: Progressing  2/12/2024 0042 by Padmini Nelson  RN  Outcome: Progressing  Goal: Maintain or return to baseline ADL function  Description: INTERVENTIONS:  -  Assess patient's ability to carry out ADLs; assess patient's baseline for ADL function and identify physical deficits which impact ability to perform ADLs (bathing, care of mouth/teeth, toileting, grooming, dressing, etc.)  - Assess/evaluate cause of self-care deficits   - Assess range of motion  - Assess patient's mobility; develop plan if impaired  - Assess patient's need for assistive devices and provide as appropriate  - Encourage maximum independence but intervene and supervise when necessary  - Involve family in performance of ADLs  - Assess for home care needs following discharge   - Consider OT consult to assist with ADL evaluation and planning for discharge  - Provide patient education as appropriate  2/12/2024 0043 by Padmini Nelson RN  Outcome: Progressing  2/12/2024 0042 by Padmini Nelson RN  Outcome: Progressing  Goal: Maintains/Returns to pre admission functional level  Description: INTERVENTIONS:  - Perform AM-PAC 6 Click Basic Mobility/ Daily Activity assessment daily.  - Set and communicate daily mobility goal to care team and patient/family/caregiver.   - Collaborate with rehabilitation services on mobility goals if consulted  - Perform Range of Motion 2 times a day.  - Reposition patient every 2 hours.  - Dangle patient 2 times a day  - Stand patient 2 times a day  - Ambulate patient 2 times a day  - Out of bed to chair 2 times a day   - Out of bed for meals 2 times a day  - Out of bed for toileting  - Record patient progress and toleration of activity level   2/12/2024 0043 by Padmini Nelson RN  Outcome: Progressing  2/12/2024 0042 by Padmini Nelson RN  Outcome: Progressing     Problem: DISCHARGE PLANNING  Goal: Discharge to home or other facility with appropriate resources  Description: INTERVENTIONS:  - Identify barriers to discharge w/patient and caregiver  - Arrange for needed  discharge resources and transportation as appropriate  - Identify discharge learning needs (meds, wound care, etc.)  - Arrange for interpretive services to assist at discharge as needed  - Refer to Case Management Department for coordinating discharge planning if the patient needs post-hospital services based on physician/advanced practitioner order or complex needs related to functional status, cognitive ability, or social support system  2/12/2024 0043 by Padmini Nelson RN  Outcome: Progressing  2/12/2024 0042 by Padmini Nelson RN  Outcome: Progressing     Problem: Knowledge Deficit  Goal: Patient/family/caregiver demonstrates understanding of disease process, treatment plan, medications, and discharge instructions  Description: Complete learning assessment and assess knowledge base.  Interventions:  - Provide teaching at level of understanding  - Provide teaching via preferred learning methods  2/12/2024 0043 by Padmini Nelson RN  Outcome: Progressing  2/12/2024 0042 by Padmini Nelson RN  Outcome: Progressing

## 2024-02-12 NOTE — ASSESSMENT & PLAN NOTE
Possibly reactive in nature due to fracture vs. Hemoconcentration  Resolved this AM following IVF resuscitation  Continue to monitor

## 2024-02-12 NOTE — NURSING NOTE
Pt admitted to room 312 from ER. Alert and oriented, pleasant and cooperative. Assessment benign except for R leg, splint, ace wrap in place. Toes pink and mobile with good cap refill.  staying at bedside. Pain meds given per order. Pt is NPO status.

## 2024-02-12 NOTE — H&P
[facility]  H&P  Name: Tonie Figueroa 33 y.o. female I MRN: [id number]  Unit/Bed#: ED 18 I Date of Admission: 2/11/2024   Date of Service: 2/11/2024 I Hospital Day: 0      Assessment/Plan   * Tibia/fibula fracture, right, closed, initial encounter  Assessment & Plan  Patient skiing with injury.   NPO  Ortho consult  Pain control    Leukemoid reaction  Assessment & Plan  Suspect reactive  monitor       VTE Pharmacologic Prophylaxis: VTE Score: 5 High Risk (Score >/= 5) - Pharmacological DVT Prophylaxis Ordered: heparin. Sequential Compression Devices Ordered.  Code Status: Level 1 - Full Code   Discussion with patient    Anticipated Length of Stay: Patient will be admitted on an inpatient basis with an anticipated length of stay of greater than 2 midnights secondary to NPO for surgery.    Total Time Spent on Date of Encounter in care of patient: 65 mins. This time was spent on one or more of the following: performing physical exam; counseling and coordination of care; obtaining or reviewing history; documenting in the medical record; reviewing/ordering tests, medications or procedures; communicating with other healthcare professionals and discussing with patient's family/caregivers.    Chief Complaint: skiing injury    History of Present Illness:  Tonie Figueroa is a 33 y.o. female with no significant PMHx who presents with skiing injury twisted leg. Patient was skiing and fell, the ski did not come off and her leg twisted. Severe pain, 10/10. Pain med did give some relief. No cp/sob    Review of Systems:  Review of Systems   Respiratory:  Negative for cough and shortness of breath.    Cardiovascular:  Negative for chest pain and palpitations.   Gastrointestinal:  Negative for abdominal pain, diarrhea, nausea and vomiting.   Musculoskeletal:  Positive for arthralgias.   Skin:  Negative for pallor and wound.   Neurological:  Negative for dizziness, weakness and headaches.    Psychiatric/Behavioral:  Negative for agitation and confusion.    All other systems reviewed and are negative.      Past Medical and Surgical History:   History reviewed. No pertinent past medical history.    History reviewed. No pertinent surgical history.    Meds/Allergies:  Prior to Admission medications    Not on File     I have reviewed home medications with patient personally.    Allergies: No Known Allergies    Social History:  Marital Status: /Civil Union   Occupation:   Patient Pre-hospital Living Situation: Home  Patient Pre-hospital Level of Mobility: walks  Patient Pre-hospital Diet Restrictions: vegetarian  Substance Use History:   Social History     Substance and Sexual Activity   Alcohol Use Yes    Comment: social     Social History     Tobacco Use   Smoking Status Never   Smokeless Tobacco Never     Social History     Substance and Sexual Activity   Drug Use Not on file       Family History:  Family History   Problem Relation Age of Onset    Arthritis Mother     Heart disease Father        Physical Exam:     Vitals:   Blood Pressure: 117/64 (02/11/24 1859)  Pulse: 78 (02/11/24 1859)  Temperature: 98.4 °F (36.9 °C) (02/11/24 1901)  Temp Source: Oral (02/11/24 1901)  Respirations: 18 (02/11/24 1859)  Weight - Scale: 52 kg (114 lb 10.2 oz) (02/11/24 1859)  SpO2: 100 % (02/11/24 1859)    Physical Exam  Vitals reviewed.   Constitutional:       Appearance: Normal appearance.      Comments: Middle aged female   HENT:      Head: Normocephalic and atraumatic.      Nose: Nose normal.   Eyes:      General:         Right eye: No discharge.         Left eye: No discharge.      Extraocular Movements: Extraocular movements intact.      Conjunctiva/sclera: Conjunctivae normal.   Cardiovascular:      Rate and Rhythm: Normal rate and regular rhythm.   Pulmonary:      Effort: Pulmonary effort is normal. No respiratory distress.      Breath sounds: Normal breath sounds. No wheezing.    Abdominal:      General: Bowel sounds are normal. There is no distension.      Palpations: Abdomen is soft.      Tenderness: There is no abdominal tenderness. There is no guarding.   Musculoskeletal:         General: Swelling, tenderness and deformity (right leg) present. Normal range of motion.      Cervical back: Normal range of motion.      Right lower leg: No edema.      Left lower leg: No edema.   Skin:     General: Skin is warm and dry.      Capillary Refill: Capillary refill takes less than 2 seconds.      Comments: Vitiligo throughout   Neurological:      General: No focal deficit present.      Mental Status: She is alert and oriented to person, place, and time. Mental status is at baseline.   Psychiatric:         Mood and Affect: Mood normal.         Behavior: Behavior normal.         Thought Content: Thought content normal.         Judgment: Judgment normal.          Additional Data:     Lab Results:  Results from last 7 days   Lab Units 02/11/24  1932   WBC Thousand/uL 15.77*   HEMOGLOBIN g/dL 12.3   HEMATOCRIT % 35.7   PLATELETS Thousands/uL 368   NEUTROS PCT % 77*   LYMPHS PCT % 17   MONOS PCT % 6   EOS PCT % 0     Results from last 7 days   Lab Units 02/11/24  1932   SODIUM mmol/L 136   POTASSIUM mmol/L 3.7   CHLORIDE mmol/L 101   CO2 mmol/L 23   BUN mg/dL 20   CREATININE mg/dL 0.83   ANION GAP mmol/L 12   CALCIUM mg/dL 9.1   GLUCOSE RANDOM mg/dL 116       Lines/Drains:  Invasive Devices       Peripheral Intravenous Line  Duration             Peripheral IV 02/11/24 Left;Proximal;Ventral (anterior) Forearm <1 day                  Imaging: oblique fracture shaft tib/fib my read, formal read pending  XR tibia fibula 2 views RIGHT    (Results Pending)   CT lower extremity wo contrast right    (Results Pending)       EKG and Other Studies Reviewed on Admission:   EKG: will obtain for preop    ** Please Note: This note has been constructed using a voice recognition system. **

## 2024-02-12 NOTE — ED PROVIDER NOTES
History  Chief Complaint   Patient presents with    Leg Injury     Patient reports that she [place].  She had her skis crossed and her right leg became twisted.  She had immediate severe pain.  Has been unable to ambulate since then.  Worse with palpation and movement.  Nothing is making it feel better.  Has never injured this leg before.  Is not on blood thinners.  Did not hit any other part of her body.  Has no other complaints.        None       History reviewed. No pertinent past medical history.    History reviewed. No pertinent surgical history.    History reviewed. No pertinent family history.  I have reviewed and agree with the history as documented.    E-Cigarette/Vaping     E-Cigarette/Vaping Substances     Social History     Tobacco Use    Smoking status: Never    Smokeless tobacco: Never       Review of Systems   Constitutional:  Negative for activity change, chills, fatigue and fever.   HENT:  Negative for congestion.    Eyes:  Negative for visual disturbance.   Respiratory:  Negative for cough, chest tightness and shortness of breath.    Cardiovascular:  Negative for chest pain.   Gastrointestinal:  Negative for abdominal pain, diarrhea and vomiting.   Genitourinary:  Negative for dysuria.   Skin:  Negative for rash.   Neurological:  Negative for dizziness, weakness and numbness.       Physical Exam  Physical Exam  Constitutional:       General: She is in acute distress.      Appearance: She is well-developed. Toxic: due to pain.   HENT:      Head: Normocephalic and atraumatic.      Right Ear: External ear normal.      Left Ear: External ear normal.      Nose: Nose normal.      Mouth/Throat:      Mouth: Mucous membranes are moist.      Pharynx: Oropharynx is clear.   Eyes:      Conjunctiva/sclera: Conjunctivae normal.      Pupils: Pupils are equal, round, and reactive to light.   Cardiovascular:      Rate and Rhythm: Normal rate and regular rhythm.      Heart sounds: Normal  heart sounds.   Pulmonary:      Effort: Pulmonary effort is normal. No respiratory distress.      Breath sounds: Normal breath sounds.   Abdominal:      General: Bowel sounds are normal. There is no distension.      Palpations: Abdomen is soft.      Tenderness: There is no abdominal tenderness. There is no guarding or rebound.   Musculoskeletal:         General: Swelling, tenderness, deformity and signs of injury present.      Cervical back: Normal range of motion and neck supple.      Comments: Obvious right tib deformity, closed, no tenting   Skin:     General: Skin is warm and dry.      Capillary Refill: Capillary refill takes less than 2 seconds.   Neurological:      Mental Status: She is alert and oriented to person, place, and time.   Psychiatric:         Behavior: Behavior normal.         Vital Signs  ED Triage Vitals   Temperature Pulse Respirations Blood Pressure SpO2   02/11/24 1901 02/11/24 1859 02/11/24 1859 02/11/24 1859 02/11/24 1859   98.4 °F (36.9 °C) 78 18 117/64 100 %      Temp Source Heart Rate Source Patient Position - Orthostatic VS BP Location FiO2 (%)   02/11/24 1901 02/11/24 1859 02/11/24 1859 02/11/24 1859 --   Oral Monitor Sitting Left arm       Pain Score       02/11/24 1859       10 - Worst Possible Pain           Vitals:    02/11/24 1859   BP: 117/64   Pulse: 78   Patient Position - Orthostatic VS: Sitting         Visual Acuity      ED Medications  Medications   acetaminophen (TYLENOL) tablet 650 mg (has no administration in time range)   ondansetron (ZOFRAN) injection 4 mg (has no administration in time range)   sodium chloride 0.9 % infusion (has no administration in time range)   heparin (porcine) subcutaneous injection 5,000 Units (has no administration in time range)   fentanyl citrate (PF) 100 MCG/2ML 25 mcg (25 mcg Intravenous Given 2/11/24 1926)   fentanyl citrate (PF) 100 MCG/2ML 25 mcg (25 mcg Intravenous Given 2/11/24 2004)       Diagnostic Studies  Results Reviewed        Procedure Component Value Units Date/Time    Procalcitonin [[id number]]     Lab Status: No result Specimen: Blood     Basic metabolic panel [[id number]] Collected: 02/11/24 1932    Lab Status: Final result Specimen: Blood from Arm, Left Updated: 02/11/24 1952     Sodium 136 mmol/L      Potassium 3.7 mmol/L      Chloride 101 mmol/L      CO2 23 mmol/L      ANION GAP 12 mmol/L      BUN 20 mg/dL      Creatinine 0.83 mg/dL      Glucose 116 mg/dL      Calcium 9.1 mg/dL      eGFR 93 ml/min/1.73sq m     Narrative:      National Kid[employer] guidelines for Chronic Kidney Disease (CKD):     Stage 1 with normal or high GFR (GFR > 90 mL/min/1.73 square meters)    Stage 2 Mild CKD (GFR = 60-89 mL/min/1.73 square meters)    Stage 3A Moderate CKD (GFR = 45-59 mL/min/1.73 square meters)    Stage 3B Moderate CKD (GFR = 30-44 mL/min/1.73 square meters)    Stage 4 Severe CKD (GFR = 15-29 mL/min/1.73 square meters)    Stage 5 End Stage CKD (GFR <15 mL/min/1.73 square meters)  Note: GFR calculation is accurate only with a steady state creatinine    CBC and differential [[id number]]  (Abnormal) Collected: 02/11/24 1932    Lab Status: Final result Specimen: Blood from Arm, Left Updated: 02/11/24 1937     WBC 15.77 Thousand/uL      RBC 4.10 Million/uL      Hemoglobin 12.3 g/dL      Hematocrit 35.7 %      MCV 87 fL      MCH 30.0 pg      MCHC 34.5 g/dL      RDW 13.3 %      MPV 9.0 fL      Platelets 368 Thousands/uL      nRBC 0 /100 WBCs      Neutrophils Relative 77 %      Immat GRANS % 0 %      Lymphocytes Relative 17 %      Monocytes Relative 6 %      Eosinophils Relative 0 %      Basophils Relative 0 %      Neutrophils Absolute 12.02 Thousands/µL      Immature Grans Absolute 0.06 Thousand/uL      Lymphocytes Absolute 2.73 Thousands/µL      Monocytes Absolute 0.91 Thousand/µL      Eosinophils Absolute 0.01 Thousand/µL      Basophils Absolute 0.04 Thousands/µL                    XR tibia fibula 2 views RIGHT    (Results Pending)    CT lower extremity wo contrast right    (Results Pending)              Procedures  Procedural Sedation    Date/Time: 2/11/2024 11:05 PM    Performed by: Andrade Hutchinson MD  Authorized by: Andrade Hutchinson MD    Immediate pre-procedure details:     Reviewed: vital signs, relevant labs/tests and NPO status    Procedure details (see MAR for exact dosages):     Preoxygenation:  Room air    Sedation:  Ketamine    Analgesia:  Fentanyl    Intra-procedure monitoring:  Blood pressure monitoring, cardiac monitor, continuous pulse oximetry, frequent LOC assessments and frequent vital sign checks    Intra-procedure events: none      Intra-procedure management:  Supplemental oxygen    Total sedation time (minutes):  30  Post-procedure details:     Attendance: Constant attendance by certified staff until patient recovered      Recovery: Patient returned to pre-procedure baseline      Post-sedation assessments completed and reviewed: post-procedure airway patency not reviewed, post-procedure cardiovascular function not reviewed, post-procedure mental status not reviewed, post-procedure nausea and vomiting status not reviewed, pain score not reviewed and post-procedure respiratory function not reviewed      Patient is stable for discharge or admission: yes      Patient tolerance:  Tolerated well, no immediate complications  Splint application    Date/Time: 2/11/2024 11:06 PM    Performed by: Andrade Hutchinson MD  Authorized by: Andrade Hutchinson MD  Universal Protocol:  Consent: Verbal consent obtained.  Patient understanding: patient states understanding of the procedure being performed  Radiology Images displayed and confirmed. If images not available, report reviewed: imaging studies available  Patient identity confirmed: verbally with patient and arm band    Pre-procedure details:     Sensation:  Normal  Procedure details:     Laterality:  Right    Location:  Leg    Leg:  R lower legCast type:  Long leg        Supplies:   Ortho-Glass, elastic bandage and cotton padding           ED Course                               SBIRT 20yo+      Flowsheet Row Most Recent Value   Initial Alcohol Screen: US AUDIT-C     1. How often do you have a drink containing alcohol? 0 Filed at: 02/11/2024 1900   2. How many drinks containing alcohol do you have on a typical day you are drinking?  0 Filed at: 02/11/2024 1900   3a. Male UNDER 65: How often do you have five or more drinks on one occasion? 0 Filed at: 02/11/2024 1900   3b. FEMALE Any Age, or MALE 65+: How often do you have 4 or more drinks on one occassion? 0 Filed at: 02/11/2024 1900   Audit-C Score 0 Filed at: 02/11/2024 1900   GANESH: How many times in the past year have you...    Used an illegal drug or used a prescription medication for non-medical reasons? Never Filed at: 02/11/2024 1900                      Medical Decision Making  33-year-old female with complex tibia and fibular fracture.  Closed.  Pain control was difficult.  Given conscious sedation while splinting.  Ofirmev, Toradol, fentanyl, and ketamine used.  Patient is neurovascularly intact.  Static splint posterior long-leg and stirrup on the right lower extremity applied.  Patient states understanding agreement with the plan.  Stable at the time of admission.    Problems Addressed:  Closed fracture of right tibia and fibula, initial encounter: acute illness or injury    Amount and/or Complexity of Data Reviewed  Independent Historian: spouse  Labs: ordered.  Radiology: ordered.  Discussion of management or test interpretation with external provider(s): Case discussed with Dr. Jun Jolley of orthopedics who stated he will take the patient to the OR in the morning    Risk  OTC drugs.  Prescription drug management.  Parenteral controlled substances.  Drug therapy requiring intensive monitoring for toxicity.  Decision regarding hospitalization.             Disposition  Final diagnoses:   Closed fracture of right tibia and fibula,  initial encounter     Time reflects when diagnosis was documented in both MDM as applicable and the Disposition within this note       Time User Action Codes Description Comment    2/11/2024  8:15 PM Jun Jolley Add [S82.231A] Closed displaced oblique fracture of shaft of right tibia, initial encounter     2/11/2024  8:38 PM Andrade Hutchinson Add [S82.201A,  S82.401A] Closed fracture of right tibia and fibula, initial encounter           ED Disposition       ED Disposition   Admit    Condition   Stable    Date/Time   Sun Feb 11, 2024 2007    Comment                  Follow-up Information    None         Patient's Medications    No medications on file       No discharge procedures on file.    PDMP Review       None            ED Provider  Electronically Signed by             Andrade Hutchinson MD  02/11/24 8931

## 2024-02-12 NOTE — ASSESSMENT & PLAN NOTE
No active bleeding noted; possible dilutional as all cell lines have decreased  Continue to monitor particularly post-operatively   Transfuse for < Hgb 7

## 2024-02-13 LAB
ANION GAP SERPL CALCULATED.3IONS-SCNC: 10 MMOL/L
BUN SERPL-MCNC: 8 MG/DL (ref 5–25)
CALCIUM SERPL-MCNC: 8.1 MG/DL (ref 8.4–10.2)
CHLORIDE SERPL-SCNC: 105 MMOL/L (ref 96–108)
CO2 SERPL-SCNC: 23 MMOL/L (ref 21–32)
CREAT SERPL-MCNC: 0.6 MG/DL (ref 0.6–1.3)
GFR SERPL CREATININE-BSD FRML MDRD: 120 ML/MIN/1.73SQ M
GLUCOSE SERPL-MCNC: 105 MG/DL (ref 65–140)
POTASSIUM SERPL-SCNC: 4 MMOL/L (ref 3.5–5.3)
SODIUM SERPL-SCNC: 138 MMOL/L (ref 135–147)

## 2024-02-13 PROCEDURE — 97116 GAIT TRAINING THERAPY: CPT

## 2024-02-13 PROCEDURE — 99232 SBSQ HOSP IP/OBS MODERATE 35: CPT | Performed by: INTERNAL MEDICINE

## 2024-02-13 PROCEDURE — 97166 OT EVAL MOD COMPLEX 45 MIN: CPT

## 2024-02-13 PROCEDURE — 80048 BASIC METABOLIC PNL TOTAL CA: CPT

## 2024-02-13 PROCEDURE — 99024 POSTOP FOLLOW-UP VISIT: CPT | Performed by: PHYSICIAN ASSISTANT

## 2024-02-13 PROCEDURE — 97530 THERAPEUTIC ACTIVITIES: CPT

## 2024-02-13 PROCEDURE — 97163 PT EVAL HIGH COMPLEX 45 MIN: CPT

## 2024-02-13 RX ADMIN — ACETAMINOPHEN 975 MG: 325 TABLET ORAL at 10:34

## 2024-02-13 RX ADMIN — CEFAZOLIN SODIUM 1000 MG: 1 SOLUTION INTRAVENOUS at 02:12

## 2024-02-13 RX ADMIN — HYDROMORPHONE HYDROCHLORIDE 1 MG: 1 INJECTION, SOLUTION INTRAMUSCULAR; INTRAVENOUS; SUBCUTANEOUS at 10:59

## 2024-02-13 RX ADMIN — ASPIRIN 81 MG: 81 TABLET, COATED ORAL at 21:09

## 2024-02-13 RX ADMIN — ASPIRIN 81 MG: 81 TABLET, COATED ORAL at 06:25

## 2024-02-13 RX ADMIN — CEFAZOLIN SODIUM 1000 MG: 1 SOLUTION INTRAVENOUS at 08:56

## 2024-02-13 RX ADMIN — SODIUM CHLORIDE, SODIUM LACTATE, POTASSIUM CHLORIDE, AND CALCIUM CHLORIDE 125 ML/HR: .6; .31; .03; .02 INJECTION, SOLUTION INTRAVENOUS at 00:04

## 2024-02-13 RX ADMIN — ACETAMINOPHEN 975 MG: 325 TABLET ORAL at 02:15

## 2024-02-13 RX ADMIN — OXYCODONE HYDROCHLORIDE 10 MG: 10 TABLET ORAL at 21:08

## 2024-02-13 RX ADMIN — ACETAMINOPHEN 975 MG: 325 TABLET ORAL at 17:26

## 2024-02-13 RX ADMIN — OXYCODONE HYDROCHLORIDE 10 MG: 10 TABLET ORAL at 00:08

## 2024-02-13 RX ADMIN — SENNOSIDES 8.6 MG: 8.6 TABLET, FILM COATED ORAL at 08:56

## 2024-02-13 NOTE — PHYSICAL THERAPY NOTE
"   PHYSICAL THERAPY TREATMENT NOTE  NAME:  Tonie Figueroa  DATE: 02/13/24    Length Of Stay: 2  Performed at least 2 patient identifiers during session: Name and ID bracelet    TREATMENT FLOWSHEET:    02/13/24 1406   PT Last Visit   PT Visit Date 02/13/24   Note Type   Note Type Treatment   Pain Assessment   Pain Assessment Tool 0-10   Pain Score 10 - Worst Possible Pain   Pain Location/Orientation Orientation: Right;Location: Leg   Pain Onset/Description Onset: Ongoing;Frequency: Constant/Continuous;Descriptor: Aching;Descriptor: Pressure;Descriptor: Throbbing   Patient's Stated Pain Goal No pain   Hospital Pain Intervention(s) Repositioned;Ambulation/increased activity;Emotional support   Restrictions/Precautions   Weight Bearing Precautions Per Order Yes   RLE Weight Bearing Per Order FWB   General   Response to Previous Treatment Patient with no complaints from previous session.   Family/Caregiver Present Yes   Cognition   Overall Cognitive Status WFL   Arousal/Participation Alert;Cooperative   Attention Attends with cues to redirect   Orientation Level Oriented X4   Memory Within functional limits   Following Commands Follows one step commands with increased time or repetition   Subjective   Subjective \"I broke my leg skiing.\"   Bed Mobility   Additional Comments Found sitting OOB in recliner upon entering room   Transfers   Sit to Stand   (CG)   Additional items Assist x 1;Armrests;Increased time required;Verbal cues  (RW)   Stand to Sit   (CG)   Additional items Assist x 1;Armrests;Increased time required;Verbal cues   Stand pivot   (CG)   Additional items Assist x 1;Armrests;Increased time required;Verbal cues  (RW)   Toilet transfer 4  Minimal assistance   Additional items Assist x 1;Armrests;Increased time required;Verbal cues;Commode  (RW)   Additional Comments VC's fpr proper hand placement during transitions   Ambulation/Elevation   Gait pattern Improper Weight shift;Decreased foot clearance;Forward " Flexion;Decreased R stance;Short stride;Excessively slow;Step to;Decreased heel strike;Decreased toe off   Gait Assistance   (CG)   Additional items Assist x 1;Verbal cues   Assistive Device Rolling walker   Distance 15 ft x2   Ambulation/Elevation Additional Comments VC's for proper gait sequence with RW   Balance   Static Sitting Good   Dynamic Sitting Fair +   Static Standing Fair +   Dynamic Standing Fair   Ambulatory Fair   Endurance Deficit   Endurance Deficit Yes   Activity Tolerance   Activity Tolerance Patient limited by fatigue;Patient limited by pain   Nurse Made Aware yes   Assessment   Prognosis Good   Problem List Decreased range of motion;Decreased strength;Impaired balance;Decreased endurance;Decreased mobility;Decreased coordination;Pain;Orthopedic restrictions;Decreased skin integrity   Goals   Patient Goals to get moving better with less pain   PT Treatment Day 1   Plan   Treatment/Interventions Functional transfer training;LE strengthening/ROM;Endurance training;Patient/family training;Equipment eval/education;Gait training;Compensatory technique education;Spoke to nursing;Family   Progress Slow progress, decreased activity tolerance   PT Frequency 5-7x/wk   Discharge Recommendation   Rehab Resource Intensity Level, PT III (Minimum Resource Intensity)   Equipment Recommended Walker   Walker Package Recommended Wheeled walker   Change/add to Walker Package? No   Additional Comments (S)  Pt. may also benefit from BSC for use over top of toilet upon DC to home   AM-PAC Basic Mobility Inpatient   Turning in Flat Bed Without Bedrails 2   Lying on Back to Sitting on Edge of Flat Bed Without Bedrails 2   Moving Bed to Chair 3   Standing Up From Chair Using Arms 3   Walk in Room 3   Climb 3-5 Stairs With Railing 3   Basic Mobility Inpatient Raw Score 16   Basic Mobility Standardized Score 38.32   Highest Level Of Mobility   JH-HLM Goal 5: Stand one or more mins   JH-HLM Achieved 7: Walk 25 feet or more        The patient's AM-PAC Basic Mobility Inpatient Short Form Raw Score is 16. A raw score 16 suggests the patient may benefit from discharge to post-acute rehabilitation services. Please also refer to the recommendation of the Physical Therapist for safe discharge planning.    Pt seen for PT treatment session this date with interventions consisting of transfer training, gait training, toilet transfers, and education provided as needed for safety and direction to improve functional mobility, safety awareness, and activity tolerance. Pt agreeable to PT treatment session upon arrival, pt found sitting OOB in recliner. At end of session, pt left sitting OOB in recliner with BLE elevated and with all needs in reach. In comparison to previous session, pt with improvements in amount of assistance required for transfers and ambulation . Continue to recommend Level III (Minimum Resource Intensity) at time of d/c in order to maximize pt's functional independence and safety w/ mobility. Pt continues to be functioning below baseline level. PT will continue to see pt while here in order to address the deficits listed above and provide interventions consistent w/ POC in effort to achieve STGs.    Caprice Anderson, PTA

## 2024-02-13 NOTE — PROGRESS NOTES
Formerly [facility], [facility]  Progress Note  Name: Tonie Figueroa I  MRN: [id number]  Unit/Bed#: -01 I Date of Admission: 2/11/2024   Date of Service: 2/13/2024 I Hospital Day: 2    Assessment/Plan   * Tibia/fibula fracture, right, closed, initial encounter  Assessment & Plan  Presented following skiing injury for which she fell and twisted her long with subsequent severe pain  CT RLE (2/11): Oblique diaphyseal fracture of the distal tibia with half shaft width posterolateral displacement. Comminuted fracture of the proximal fibular diaphysis with posterior butterfly fragment. Major distal fragment shows half shaft width anterior displacement. No articular malalignment at the knee, proximal tibiofibular joint, or ankle.   S/P surgical repair of right tibia insertion of intramedullary nail with Dr. Jolley on 2/12/2024  DVT ppx recommended with ASA 81mg BID for 4 weeks  Ambulation in surgical shoe with outpatient follow-up  PT/OT recommending over night stay with continued mobility work and discharge tomorrow with outpatient therapy servies    Leukemoid reaction  Assessment & Plan  Possibly reactive in nature due to fracture/surgical intervention vs. Hemoconcentration  No infectious etiology identified  Continue to monitor     Anemia  Assessment & Plan  No active bleeding noted; possible dilutional as all cell lines have decreased  Remains stable post-operatively   Continue to monitor for active bleeding and transfuse for < Hgb 7           VTE Pharmacologic Prophylaxis: VTE Score: 5 High Risk (Score >/= 5) - Pharmacological DVT Prophylaxis Ordered: ASA 81mg BID. Sequential Compression Devices Ordered.    Mobility:   Basic Mobility Inpatient Raw Score: 6  JH-HLM Goal: 2: Bed activities/Dependent transfer  JH-HLM Achieved: 2: Bed activities/Dependent transfer  HLM Goal achieved. Continue to encourage appropriate mobility.    Patient Centered Rounds: I performed bedside rounds with nursing staff today.    Discussions with Specialists or Other Care Team Provider: Nursing, PT, and CM    Education and Discussions with Family / Patient:  Patient updated on plan of care.       Current Length of Stay: 2 day(s)  Current Patient Status: Inpatient   Certification Statement: The patient will continue to require additional inpatient hospital stay due to right tibia fracture s/p surgical intervention.  Discharge Plan: Anticipate discharge tomorrow to home.    Code Status: Level 1 - Full Code    Subjective:   Patient is resting comfortably in bed without any acute complaints.  She states her pain is controlled with current regimen.  Has not been ambulating much up until this point.  No significant overnight events reported by nursing.    Objective:     Vitals:   Temp (24hrs), Av °F (36.7 °C), Min:97.7 °F (36.5 °C), Max:98.7 °F (37.1 °C)    Temp:  [97.7 °F (36.5 °C)-98.7 °F (37.1 °C)] 97.8 °F (36.6 °C)  HR:  [54-95] 54  Resp:  [11-18] 16  BP: ()/(54-81) 111/62  SpO2:  [97 %-100 %] 100 %  Body mass index is 19.74 kg/m².     Input and Output Summary (last 24 hours):     Intake/Output Summary (Last 24 hours) at 2024 1215  Last data filed at 2024 1211  Gross per 24 hour   Intake 2709.6 ml   Output 2250 ml   Net 459.6 ml       Physical Exam:   Physical Exam  Vitals and nursing note reviewed.   Constitutional:       General: She is not in acute distress.     Appearance: She is normal weight.   HENT:      Mouth/Throat:      Mouth: Mucous membranes are moist.      Pharynx: Oropharynx is clear.   Cardiovascular:      Rate and Rhythm: Normal rate and regular rhythm.      Pulses: Normal pulses.      Heart sounds: Normal heart sounds.   Pulmonary:      Effort: Pulmonary effort is normal. No respiratory distress.      Breath sounds: Normal breath sounds.   Musculoskeletal:      Comments: RLE in surgical dressing and surgical shoe   Skin:     Comments: Vitiligo   Neurological:      General: No focal deficit present.       Mental Status: She is alert. Mental status is at baseline.   Psychiatric:         Mood and Affect: Mood normal.         Behavior: Behavior normal.          Labs:  Results from last 7 days   Lab Units 02/12/24  1841   WBC Thousand/uL 11.06*   HEMOGLOBIN g/dL 10.8*   HEMATOCRIT % 32.2*   PLATELETS Thousands/uL 281   NEUTROS PCT % 91*   LYMPHS PCT % 8*   MONOS PCT % 1*   EOS PCT % 0     Results from last 7 days   Lab Units 02/13/24  0547   SODIUM mmol/L 138   POTASSIUM mmol/L 4.0   CHLORIDE mmol/L 105   CO2 mmol/L 23   BUN mg/dL 8   CREATININE mg/dL 0.60   ANION GAP mmol/L 10   CALCIUM mg/dL 8.1*   GLUCOSE RANDOM mg/dL 105     Results from last 7 days   Lab Units 02/12/24  0537   INR  1.06             Results from last 7 days   Lab Units 02/12/24  0537 02/11/24  1932   PROCALCITONIN ng/ml 0.08 <0.05       Lines/Drains:  Invasive Devices       Peripheral Intravenous Line  Duration             Peripheral IV 02/11/24 Left;Proximal;Ventral (anterior) Forearm 1 day                          Imaging: Reviewed radiology reports from this admission including: procedure reports    Recent Cultures (last 7 days):         Last 24 Hours Medication List:   Current Facility-Administered Medications   Medication Dose Route Frequency Provider Last Rate    acetaminophen  650 mg Oral Q4H PRN Tanya Vidal PA-C      acetaminophen  975 mg Oral Q8H MICHAEL Zapata-SONAM      aspirin  81 mg Oral BID Jessica Peguero,       calcium carbonate  1,000 mg Oral Daily PRN MICHAEL Zapata-SONAM      HYDROmorphone  1 mg Intravenous Q4H PRN Fabio Sofia, PA-C      lactated ringers  1,000 mL Intravenous Once PRN MICHAEL Zapata-SONAM      And    lactated ringers  1,000 mL Intravenous Once PRN Fabio Sofia, PA-SONAM      ondansetron  4 mg Intravenous Q6H PRN Fabio Sofia, PA-SONAM      oxyCODONE  10 mg Oral Q4H PRN Fabio Sofia, PA-SONAM      oxyCODONE  5 mg Oral Q4H PRN Fabio Sofia, PA-SONAM      senna  1 tablet Oral Daily Fabio Sofia, PA-SONAM       sodium chloride  1,000 mL Intravenous Once PRN Fabio Sofia PA-C      And    sodium chloride  1,000 mL Intravenous Once PRN Fabio Sofia PA-C          Today, Patient Was Seen By: Jessica Peguero DO    **Please Note: This note may have been constructed using a voice recognition system.**

## 2024-02-13 NOTE — PLAN OF CARE
Problem: SAFETY ADULT  Goal: Maintain or return to baseline ADL function  Description: INTERVENTIONS:  -  Assess patient's ability to carry out ADLs; assess patient's baseline for ADL function and identify physical deficits which impact ability to perform ADLs (bathing, care of mouth/teeth, toileting, grooming, dressing, etc.)  - Assess/evaluate cause of self-care deficits   - Assess range of motion  - Assess patient's mobility; develop plan if impaired  - Assess patient's need for assistive devices and provide as appropriate  - Encourage maximum independence but intervene and supervise when necessary  - Involve family in performance of ADLs  - Assess for home care needs following discharge   - Consider OT consult to assist with ADL evaluation and planning for discharge  - Provide patient education as appropriate  Outcome: Progressing     Problem: Knowledge Deficit  Goal: Patient/family/caregiver demonstrates understanding of disease process, treatment plan, medications, and discharge instructions  Description: Complete learning assessment and assess knowledge base.  Interventions:  - Provide teaching at level of understanding  - Provide teaching via preferred learning methods  Outcome: Progressing     Problem: MUSCULOSKELETAL - ADULT  Goal: Maintain proper alignment of affected body part  Description: INTERVENTIONS:  - Support, maintain and protect limb and body alignment  - Provide patient/ family with appropriate education  Outcome: Progressing

## 2024-02-13 NOTE — ASSESSMENT & PLAN NOTE
Possibly reactive in nature due to fracture/surgical intervention vs. Hemoconcentration  No infectious etiology identified  Continue to monitor

## 2024-02-13 NOTE — PLAN OF CARE
Problem: PHYSICAL THERAPY ADULT  Goal: Performs mobility at highest level of function for planned discharge setting.  See evaluation for individualized goals.  Description: Treatment/Interventions: Functional transfer training, LE strengthening/ROM, Elevations, Therapeutic exercise, Endurance training, Patient/family training, Equipment eval/education, Bed mobility, Gait training, Spoke to nursing, Spoke to case management, Spoke to MD  Equipment Recommended: Walker       See flowsheet documentation for full assessment, interventions and recommendations.  Outcome: Progressing  Note: Prognosis: Good  Problem List: Decreased range of motion, Decreased strength, Impaired balance, Decreased endurance, Decreased mobility, Decreased coordination, Pain, Orthopedic restrictions, Decreased skin integrity  Assessment: Pt seen for PT treatment session this date with interventions consisting of transfer training, gait training, toilet transfers, and education provided as needed for safety and direction to improve functional mobility, safety awareness, and activity tolerance. Pt agreeable to PT treatment session upon arrival, pt found sitting OOB in recliner. At end of session, pt left sitting OOB in recliner with BLE elevated and with all needs in reach. In comparison to previous session, pt with improvements in amount of assistance required for transfers and ambulation . Continue to recommend Level III (Minimum Resource Intensity) at time of d/c in order to maximize pt's functional independence and safety w/ mobility. Pt continues to be functioning below baseline level. PT will continue to see pt while here in order to address the deficits listed above and provide interventions consistent w/ POC in effort to achieve STGs.  Barriers to Discharge: Other (Comment)  Barriers to Discharge Comments: Physical assistance of 2 for bed mobility.  Rehab Resource Intensity Level, PT: III (Minimum Resource Intensity)    See flowsheet  documentation for full assessment.

## 2024-02-13 NOTE — PHYSICAL THERAPY NOTE
Physical Therapy Evaluation     Patient's Name: Tonie Figueroa    Admitting Diagnosis  Leg injury [S89.90XA]  Closed displaced oblique fracture of shaft of right tibia, initial encounter [S82.231A]  Closed fracture of right tibia and fibula, initial encounter [S82.201A, S82.401A]    Problem List  Patient Active Problem List   Diagnosis    Tibia/fibula fracture, right, closed, initial encounter    Leukemoid reaction    Anemia       Past Medical History  History reviewed. No pertinent past medical history.    Past Surgical History  Past Surgical History:   Procedure Laterality Date    LA TX TIBL SHFT FX IMED IMPLT W/WO SCREWS&/CERCLA Right 2/12/2024    Procedure: INSERTION NAIL IM TIBIA;  Surgeon: Jun Jolley DO;  Location: [facility];  Service: Orthopedics        02/13/24 1047   PT Last Visit   PT Visit Date 02/13/24   Note Type   Note type Evaluation   Pain Assessment   Pain Assessment Tool 0-10   Pain Score 8  (5/10 rest, increased with activity)   Pain Location/Orientation Orientation: Right;Location: Knee;Location: Leg   Pain Onset/Description Onset: Ongoing;Frequency: Constant/Continuous;Descriptor: Aching;Descriptor: Sore;Other (Comment)  (reports stiff from immobility)   Effect of Pain on Daily Activities yes   Patient's Stated Pain Goal No pain   Hospital Pain Intervention(s) Repositioned;Ambulation/increased activity;Elevated;Emotional support;Environmental changes;Cold applied  (cold packs x 2 placed upon conclusion, IV Dilaudid provided sitting edge of bed by Mary QUINTERO.)   Multiple Pain Sites No   Restrictions/Precautions   Weight Bearing Precautions Per Order Yes   RLE Weight Bearing Per Order FWB   Braces or Orthoses Other (Comment)  (surgical shoe R foot, ACE wrap thigh into foot)   Other Precautions Chair Alarm;Bed Alarm;WBS;Fall Risk;Pain  (surgical site R knee)   Home Living   Type of Home Apartment  (19th floor)   Home Layout Performs ADLs on one level;Able to live on main level with  bedroom/bathroom;One level;Access;Elevator   Bathroom Shower/Tub Tub/shower unit   Bathroom Toilet Standard   Bathroom Equipment   (no prior DME usage or availability)   Bathroom Accessibility Accessible   Home Equipment   (no prior AD usage or availability)   Prior Function   Level of Gray Independent with ADLs;Independent with functional mobility;Independent with IADLS   Lives With Spouse   IADLs Independent with driving;Independent with meal prep;Independent with medication management   Falls in the last 6 months 1 to 4  (skiing incident prior to arrival)   General   Additional Pertinent History Myrisa OT present for co-assessment due to medical complexity, required skilled interventions of 2 clinicians for care delivery.   Family/Caregiver Present Yes  (spouse)   Cognition   Overall Cognitive Status WFL   Arousal/Participation Alert   Orientation Level Oriented X4   Memory Within functional limits   Following Commands Follows one step commands with increased time or repetition   Comments Tonie was agreeable to PT assessment, pleasant.   RLE Overall AROM   R Knee Flexion limited due to pain   R Knee Extension lacking terminal knee extension   Strength RLE   R Hip Flexion 3/5   R Hip Extension 3/5   R Hip ABduction 3-/5   R Knee Flexion 3-/5   R Knee Extension 3-/5   R Ankle Dorsiflexion 3+/5   R Ankle Plantar Flexion 3+/5   LLE Assessment   LLE Assessment WFL   Vision-Basic Assessment   Current Vision Wears glasses for distance only   Vestibular   Spontaneous Nystagmus (-) no evidence of nystagmus at rest in room light   Gaze Holding Nystagmus (-) no evidence of nystagmus   Coordination   Movements are Fluid and Coordinated 0   Coordination and Movement Description Incremental, antalgic mobility requiring increased time and tactile facilitation.   Sharp/Dull   RLE Sharp/Dull Grossly intact   LLE Sharp/Dull Grossly intact   Bed Mobility   Supine to Sit 3  Moderate assistance   Additional items Assist x  2;HOB elevated;Bedrails;Increased time required;LE management;Verbal cues   Sit to Supine   (DNT as Tonie was sitting out of bed on the recliner upon conclusion.)   Additional Comments Supine /62, verbal cues for bedrail utilization and deep breathing for pain modulation. Increased time at edge of bed for dependent positioning RLE.   Transfers   Sit to Stand   (min A->CGA as ROM advanced)   Additional items Assist x 1;Bedrails;Increased time required;Verbal cues;Other  (second staff member present for safety)   Stand to Sit   (CGA)   Additional items Assist x 1;Bedrails;Increased time required;Verbal cues  (RW utilization)   Stand pivot   (CGA)   Additional items Assist x 1;Increased time required;Verbal cues   Additional Comments Verbal cues for proper BUE placement with transitional movements, safety while turning, and proper AD utilization.   Ambulation/Elevation   Gait pattern Improper Weight shift;Antalgic;Forward Flexion;Decreased R stance;Short stride;Step to;Excessively slow;Foward flexed   Gait Assistance   (min A->CGA)   Additional items Assist x 1;Verbal cues;Tactile cues   Assistive Device Rolling walker   Distance 30 feet   Stair Management Assistance Not tested  (N/A)   Ambulation/Elevation Additional Comments Verbal cues for proper patterning of RW/RLE/LLE and proper AD utilization.   Balance   Static Sitting Good   Dynamic Sitting Fair +   Static Standing Fair +   Dynamic Standing Fair   Ambulatory Fair   Endurance Deficit   Endurance Deficit Yes   Activity Tolerance   Activity Tolerance Patient limited by fatigue;Patient limited by pain   Medical Staff Made Aware Yes: Dr. Peguero and CM were informed of d/c disposition recommendation.   Nurse Made Aware yes, Josee QUINTERO   Assessment   Prognosis Good   Problem List Decreased range of motion;Decreased strength;Impaired balance;Decreased endurance;Decreased mobility;Decreased coordination;Pain;Orthopedic restrictions;Decreased skin integrity    Assessment Pt is 33 y.o. female seen for PT evaluation s/p admit to  [facility]  on 2/11/2024 w/ Tibia/fibula fracture, right, closed, initial encounter. PT consulted to assess pt's functional mobility and d/c needs. Order placed for PT eval and tx, w/  FWB RLE  order. Comorbidities affecting pt's physical performance at time of assessment include: tibia/fibula fracture R  status post surgical interventions,anemia, leukemoid reaction. PTA, pt was independent w/ all functional mobility w/ o AD usage . Personal factors affecting pt at time of IE include: inability to ambulate household distances, inability to navigate community distances, inability to navigate level surfaces w/o external assistance, unable to perform dynamic tasks in community, positive fall history, unable to perform physical activity, inability to perform IADLs, and inability to perform ADLs. Please find objective findings from PT assessment regarding body systems outlined above with impairments and limitations including weakness, decreased ROM, impaired balance, decreased endurance, impaired coordination, gait deviations, pain, decreased activity tolerance, decreased functional mobility tolerance, fall risk, orthopedic restrictions, and decreased skin integrity. The following objective measures performed on IE also reveal limitations: AM-PAC 6-Clicks: 16/24. From PT/mobility standpoint, recommendation at time of d/c would be of minimal resource intensity pending progress in order to facilitate return to PLOF.   Barriers to Discharge Other (Comment)   Barriers to Discharge Comments Physical assistance of 2 for bed mobility.   Goals   Patient Goals to go home soon and to get back to normal   LTG Expiration Date 02/23/24   Long Term Goal #1 1.)Patient will complete bed mobility supervision of 1 for decrease need for caregiver assistance, decrease burden of care. 2.) Patient will complete transfers supervision of 1 to decrease risk of  falls, facilitate upright standing posture. 3.) RLE strength to greater than/equal to 3+/5 gross musculature to increase ability to safely transfer, control descent to chair. 4.) Patient will exhibit increase dynamic standing to Fair+ 3-4 minutes without LOB supervision of 1 to improve activity tolerance. 5.) Patient will exhibit increase dynamic ambulatory balance to Fair+  feet supervision of 1 to improve ability to mobilize to toilet, chair and decrease risk for additional medical complications.   PT Treatment Day 0   Plan   Treatment/Interventions Functional transfer training;LE strengthening/ROM;Elevations;Therapeutic exercise;Endurance training;Patient/family training;Equipment eval/education;Bed mobility;Gait training;Spoke to nursing;Spoke to case management;Spoke to MD   PT Frequency 5-7x/wk   Discharge Recommendation   Rehab Resource Intensity Level, PT III (Minimum Resource Intensity)   Equipment Recommended Walker   Walker Package Recommended Wheeled walker   Change/add to Walker Package? No   Additional Comments Upon conclusion, Tonie was sitting out of bed on the recliner. All of her needs were within reach.   AM-PAC Basic Mobility Inpatient   Turning in Flat Bed Without Bedrails 2   Lying on Back to Sitting on Edge of Flat Bed Without Bedrails 2   Moving Bed to Chair 3   Standing Up From Chair Using Arms 3   Walk in Room 3   Climb 3-5 Stairs With Railing 3   Basic Mobility Inpatient Raw Score 16   Basic Mobility Standardized Score 38.32   Highest Level Of Mobility   JH-HLM Goal 5: Stand one or more mins   JH-HLM Achieved 7: Walk 25 feet or more     History/Personal Factors/Comorbidities: tibia/fibula fracture R  status post surgical interventions,anemia, leukemoid reaction    # of body structures/limitations: muscle weakness, activity intolerance,decreased endurance, impaired balance, gait deviations,decreased ROM,pain    Clinical presentation: unstable as seen in constant pain severe in nature,  high fall risk,surgical site R knee with ongoing post operative management    Initial Assessment Time: 3071-3829    Leahta Mendoza, PT

## 2024-02-13 NOTE — CASE MANAGEMENT
Case Management Discharge Planning Note    Patient name Tonie Figueroa  Location /-01 MRN [id number]  : 1990 Date 2024       Current Admission Date: 2024  Current Admission Diagnosis:Tibia/fibula fracture, right, closed, initial encounter   Patient Active Problem List    Diagnosis Date Noted    Anemia 2024    Tibia/fibula fracture, right, closed, initial encounter 2024    Leukemoid reaction 2024      LOS (days): 2  Geometric Mean LOS (GMLOS) (days):   Days to GMLOS:     OBJECTIVE:  Risk of Unplanned Readmission Score: 9.88         Current admission status: Inpatient   Preferred Pharmacy:   [id number] - [place], [place] - [address]  [address]  [facility] [address]  Phone: [phone] Fax: [phone]    Primary Care Provider: No primary care provider on file.    Primary Insurance: [employer]  Secondary Insurance:     DISCHARGE DETAILS:    Discharge planning discussed with:: patient & spouse  Freedom of Choice: Yes  Comments - Freedom of Choice: recommendation is minium level lll- outpt rehab- pt will needs arx and she will go to an outpt rehab center near her home- pt needs dme equipment- choices of dme companies reviewed- pt's choice [employer]  CM contacted family/caregiver?: Yes  Were Treatment Team discharge recommendations reviewed with patient/caregiver?: Yes  Did patient/caregiver verbalize understanding of patient care needs?: Yes  Were patient/caregiver advised of the risks associated with not following Treatment Team discharge recommendations?: Yes    Contacts  Patient Contacts: Kranthi Loza  Relationship to Patient:: Family (spouse)  Contact Method: In Person  Reason/Outcome: Discharge Planning    Requested Home Health Care         Is the patient interested in HHC at discharge?: No    DME Referral Provided  Referral made for DME?: Yes  DME referral completed for the following items:: Eduardo Morrison  DME Supplier Name::  [employer]    Other Referral/Resources/Interventions Provided:  Interventions: DME, Outpatient OT, Outpatient PT  Referral Comments: pt neds rx for outpt pt/ot - commode & walker ordered via parachute - pt is awaere that there is a $4.83 copay for the walker - waiting information on the commode-    Would you like to participate in our [facility] service program?  : No - Declined    Treatment Team Recommendation: Home (home with spouse & outpt rehab & outpt follow up- spouse)                                         Family notified:: sposue at the bedside

## 2024-02-13 NOTE — ASSESSMENT & PLAN NOTE
Presented following skiing injury for which she fell and twisted her long with subsequent severe pain  CT RLE (2/11): Oblique diaphyseal fracture of the distal tibia with half shaft width posterolateral displacement. Comminuted fracture of the proximal fibular diaphysis with posterior butterfly fragment. Major distal fragment shows half shaft width anterior displacement. No articular malalignment at the knee, proximal tibiofibular joint, or ankle.   S/P surgical repair of right tibia insertion of intramedullary nail with Dr. Jolley on 2/12/2024  DVT ppx recommended with ASA 81mg BID for 4 weeks  Ambulation in surgical shoe with outpatient follow-up  PT/OT recommending over night stay with continued mobility work and discharge tomorrow with outpatient therapy servies

## 2024-02-13 NOTE — ASSESSMENT & PLAN NOTE
No active bleeding noted; possible dilutional as all cell lines have decreased  Remains stable post-operatively   Continue to monitor for active bleeding and transfuse for < Hgb 7

## 2024-02-13 NOTE — PLAN OF CARE
Problem: PHYSICAL THERAPY ADULT  Goal: Performs mobility at highest level of function for planned discharge setting.  See evaluation for individualized goals.  Description: Treatment/Interventions: Functional transfer training, LE strengthening/ROM, Elevations, Therapeutic exercise, Endurance training, Patient/family training, Equipment eval/education, Bed mobility, Gait training, Spoke to nursing, Spoke to case management, Spoke to MD  Equipment Recommended: Walker       See flowsheet documentation for full assessment, interventions and recommendations.  Note: Prognosis: Good  Problem List: Decreased range of motion, Decreased strength, Impaired balance, Decreased endurance, Decreased mobility, Decreased coordination, Pain, Orthopedic restrictions, Decreased skin integrity  Assessment: Pt is 33 y.o. female seen for PT evaluation s/p admit to  [facility]  on 2/11/2024 w/ Tibia/fibula fracture, right, closed, initial encounter. PT consulted to assess pt's functional mobility and d/c needs. Order placed for PT eval and tx, w/  FWB RLE  order. Comorbidities affecting pt's physical performance at time of assessment include: tibia/fibula fracture R  status post surgical interventions,anemia, leukemoid reaction. PTA, pt was independent w/ all functional mobility w/ o AD usage . Personal factors affecting pt at time of IE include: inability to ambulate household distances, inability to navigate community distances, inability to navigate level surfaces w/o external assistance, unable to perform dynamic tasks in community, positive fall history, unable to perform physical activity, inability to perform IADLs, and inability to perform ADLs. Please find objective findings from PT assessment regarding body systems outlined above with impairments and limitations including weakness, decreased ROM, impaired balance, decreased endurance, impaired coordination, gait deviations, pain, decreased activity tolerance,  decreased functional mobility tolerance, fall risk, orthopedic restrictions, and decreased skin integrity. The following objective measures performed on IE also reveal limitations: AM-PAC 6-Clicks: 16/24. From PT/mobility standpoint, recommendation at time of d/c would be of minimal resource intensity pending progress in order to facilitate return to PLOF.  Barriers to Discharge: Other (Comment)  Barriers to Discharge Comments: Physical assistance of 2 for bed mobility.  Rehab Resource Intensity Level, PT: III (Minimum Resource Intensity)    See flowsheet documentation for full assessment.

## 2024-02-13 NOTE — OCCUPATIONAL THERAPY NOTE
Occupational Therapy Evaluation      Tonie Figueroa    2/13/2024    Principal Problem:    Tibia/fibula fracture, right, closed, initial encounter  Active Problems:    Leukemoid reaction    Anemia      History reviewed. No pertinent past medical history.    Past Surgical History:   Procedure Laterality Date    WI TX TIBL SHFT FX IMED IMPLT W/WO SCREWS&/CERCLA Right 2/12/2024    Procedure: INSERTION NAIL IM TIBIA;  Surgeon: Jun Jolley DO;  Location: [facility];  Service: Orthopedics        02/13/24 1118   OT Last Visit   OT Visit Date 02/13/24   Note Type   Note type Evaluation   Pain Assessment   Pain Assessment Tool 0-10   Pain Score 8  (5/10 rest, increased with activity)   Pain Location/Orientation Orientation: Right;Location: Knee;Location: Leg   Pain Onset/Description Onset: Ongoing;Frequency: Constant/Continuous;Descriptor: Aching;Descriptor: Sore;Other (Comment)  (reports stiff from immobility)   Effect of Pain on Daily Activities yes   Patient's Stated Pain Goal No pain   Hospital Pain Intervention(s) Repositioned;Ambulation/increased activity;Elevated;Emotional support;Environmental changes;Cold applied  (cold packs x 2 placed upon conclusion, IV Dilaudid provided sitting edge of bed by Mary CAAL)   Multiple Pain Sites No   Restrictions/Precautions   Weight Bearing Precautions Per Order Yes   RLE Weight Bearing Per Order FWB   Braces or Orthoses Other (Comment)  (surgical shoe R foot, ACE wrap thigh into foot)   Other Precautions Chair Alarm;Bed Alarm;WBS;Fall Risk;Pain  (surgical site R knee)   Home Living   Type of Home Apartment  (19th floor)   Home Layout Performs ADLs on one level;Able to live on main level with bedroom/bathroom;One level;Access;Elevator   Bathroom Shower/Tub Tub/shower unit   Bathroom Toilet Standard   Bathroom Equipment   (no prior DME usage or availability)   Bathroom Accessibility Accessible   Home Equipment   (no prior AD usage or availability)   Prior Function   Level of  [place] Independent with ADLs;Independent with functional mobility;Independent with IADLS   Lives With Spouse   IADLs Independent with driving;Independent with meal prep;Independent with medication management   Falls in the last 6 months 1 to 4  (skiing incident prior to arrival)   ADL   UB Bathing Assistance 5  Supervision/Setup   LB Bathing Assistance 4  Minimal Assistance   UB Dressing Assistance 5  Supervision/Setup   LB Dressing Assistance 3  Moderate Assistance   Bed Mobility   Supine to Sit 3  Moderate assistance   Additional items Assist x 2;HOB elevated;Bedrails;Increased time required;LE management;Verbal cues   Additional Comments Pt remained OOB in recliner upon conclusion  (Supine /62, verbal cues for bedrail utilization and deep breathing for pain modulation. Increased time at edge of bed for dependent positioning RLE.)   Transfers   Sit to Stand   (min A->CGA as ROM advanced)   Additional items Assist x 1;Bedrails;Increased time required;Verbal cues;Other  (second staff member present for safety)   Stand to Sit   (CGA)   Additional items Assist x 1;Armrests;Increased time required;Verbal cues   Functional Mobility   Functional Mobility   (CGA)   Additional Comments A x1   Additional items Rolling walker   Balance   Static Sitting Good   Dynamic Sitting Fair +   Static Standing Fair +   Dynamic Standing Fair   Ambulatory Fair   Activity Tolerance   Activity Tolerance Patient limited by fatigue;Patient limited by pain   Medical Staff Made Aware CM notified, DO Peguero   Nurse Made Aware RN Josee NESBITT Assessment   RUE Assessment WFL   LUE Assessment   LUE Assessment WFL   Vision-Basic Assessment   Current Vision Wears glasses for distance only   Cognition   Overall Cognitive Status WFL   Arousal/Participation Alert;Cooperative   Attention Attends with cues to redirect   Orientation Level Oriented X4   Memory Within functional limits   Following Commands Follows one step commands with  increased time or repetition   Assessment   Limitation Decreased ADL status;Decreased Safe judgement during ADL;Decreased endurance;Decreased self-care trans;Decreased high-level ADLs   Prognosis Good   Assessment Pt is a 33 y.o. female seen for OT evaluation s/p admit to [facility] on 2/11/2024 w/ Tibia/fibula fracture, right, closed, initial encounter.  Comorbidities affecting pt's functional performance at time of assessment include:  Leukemoid reaction, Anemia . Personal factors affecting pt at time of IE include:difficulty performing ADLS. Prior to admission, pt was I with ADLs. Upon evaluation: the following deficits impact occupational performance: decreased balance, decreased tolerance, and increased pain. Pt to benefit from continued skilled OT tx while in the hospital to address deficits as defined above and maximize level of functional independence w ADL's and functional mobility. Occupational Performance areas to address include: bathing/shower, toilet hygiene, dressing, functional mobility, and clothing management. From OT standpoint, recommendation at time of d/c would be Level III (Minimum Resource Intensity).   Goals   Patient Goals to go home soon and to get back to normal   Plan   Treatment Interventions ADL retraining;Functional transfer training;Endurance training;Patient/family training;Equipment evaluation/education;Compensatory technique education;Energy conservation;Activityengagement   Goal Expiration Date 02/23/24   OT Treatment Day 0   OT Frequency 3-5x/wk   Discharge Recommendation   Rehab Resource Intensity Level, OT III (Minimum Resource Intensity)   Additional Comments  Pt seen as a co-eval with PT due to the patient's co-morbidities, clinically unstable presentation, and present impairments which are a regression from the patient's baseline.   Additional Comments 2 The patient's raw score on the AM-PAC Daily Activity Inpatient Short Form is 19. A raw score of greater than or equal to  19 suggests the patient may benefit from discharge to home. Please refer to the recommendation of the Occupational Therapist for safe discharge planning.   AM-PAC Daily Activity Inpatient   Lower Body Dressing 2   Bathing 3   Toileting 3   Upper Body Dressing 3   Grooming 4   Eating 4   Daily Activity Raw Score 19   Daily Activity Standardized Score (Calc for Raw Score >=11) 40.22   AM-PAC Applied Cognition Inpatient   Following a Speech/Presentation 4   Understanding Ordinary Conversation 4   Taking Medications 4   Remembering Where Things Are Placed or Put Away 4   Remembering List of 4-5 Errands 4   Taking Care of Complicated Tasks 4   Applied Cognition Raw Score 24   Applied Cognition Standardized Score 62.21     GOALS:    Pt will achieve the following within specified time frame: STG  Pt will achieve the following goals within 5 days    *ADL transfers with (S) for inc'd independence with ADLs/purposeful tasks    *UB ADL with (I) for inc'd independence with self cares    *LB ADL with Min (A) using AE prn for inc'd independence with self cares    *Toileting with (S) for clothing management and hygiene for return to PLOF with personal care    *Increase static stand balance to G- and dyn stand balance to F+ for inc'd safety with standing purposeful tasks    *Increase stand tolerance x3 m for inc'd tolerance with standing purposeful tasks    *Participate in 10m UE therex to increase overall stamina/activity tolerance for purposeful tasks    *Bed mobility- Min (A) for inc'd independence to manage own comfort and initiate EOB & OOB purposeful tasks    Pt will achieve the following within specified time frame: LTG  Pt will achieve the following goals within 10 days    *ADL transfers with (I) for inc'd independence with ADLs/purposeful tasks    *LB ADL with CGA using AE prn for inc'd independence with self cares    *Toileting with (I) for clothing management and hygiene for return to PLOF with personal care    *Increase  static stand balance to G and dyn stand balance to G- for inc'd safety with standing purposeful tasks    *Increase stand tolerance x5 m for inc'd tolerance with standing purposeful tasks    *Bed mobility- CGA for inc'd independence to manage own comfort and initiate EOB & OOB purposeful tasks      Mp Groves MS, OTR/L

## 2024-02-13 NOTE — NURSING NOTE
Patient received from recovery room, R leg with good neurovascular checks, toes warm and moveable, no numbness, PPP. Patient groggy, pain level 4 out of 10. VSS

## 2024-02-13 NOTE — PLAN OF CARE
Problem: OCCUPATIONAL THERAPY ADULT  Goal: Performs self-care activities at highest level of function for planned discharge setting.  See evaluation for individualized goals.  Description: Treatment Interventions: ADL retraining, Functional transfer training, Endurance training, Patient/family training, Equipment evaluation/education, Compensatory technique education, Energy conservation, Activityengagement    See flowsheet documentation for full assessment, interventions and recommendations.   Note: Limitation: Decreased ADL status, Decreased Safe judgement during ADL, Decreased endurance, Decreased self-care trans, Decreased high-level ADLs  Prognosis: Good  Assessment: Pt is a 33 y.o. female seen for OT evaluation s/p admit to [facility] on 2/11/2024 w/ Tibia/fibula fracture, right, closed, initial encounter.  Comorbidities affecting pt's functional performance at time of assessment include:  Leukemoid reaction, Anemia . Personal factors affecting pt at time of IE include:difficulty performing ADLS. Prior to admission, pt was I with ADLs. Upon evaluation: the following deficits impact occupational performance: decreased balance, decreased tolerance, and increased pain. Pt to benefit from continued skilled OT tx while in the hospital to address deficits as defined above and maximize level of functional independence w ADL's and functional mobility. Occupational Performance areas to address include: bathing/shower, toilet hygiene, dressing, functional mobility, and clothing management. From OT standpoint, recommendation at time of d/c would be Level III (Minimum Resource Intensity).     Rehab Resource Intensity Level, OT: III (Minimum Resource Intensity)     Mp Groves MS, OTR/L

## 2024-02-13 NOTE — PLAN OF CARE
Problem: Prexisting or High Potential for Compromised Skin Integrity  Goal: Skin integrity is maintained or improved  Description: INTERVENTIONS:  - Identify patients at risk for skin breakdown  - Assess and monitor skin integrity  - Assess and monitor nutrition and hydration status  - Monitor labs   - Assess for incontinence   - Turn and reposition patient  - Assist with mobility/ambulation  - Relieve pressure over bony prominences  - Avoid friction and shearing  - Provide appropriate hygiene as needed including keeping skin clean and dry  - Evaluate need for skin moisturizer/barrier cream  - Collaborate with interdisciplinary team   - Patient/family teaching  - Consider wound care consult   Outcome: Progressing     Problem: PAIN - ADULT  Goal: Verbalizes/displays adequate comfort level or baseline comfort level  Description: Interventions:  - Encourage patient to monitor pain and request assistance  - Assess pain using appropriate pain scale  - Administer analgesics based on type and severity of pain and evaluate response  - Implement non-pharmacological measures as appropriate and evaluate response  - Consider cultural and social influences on pain and pain management  - Notify physician/advanced practitioner if interventions unsuccessful or patient reports new pain  Outcome: Progressing     Problem: INFECTION - ADULT  Goal: Absence or prevention of progression during hospitalization  Description: INTERVENTIONS:  - Assess and monitor for signs and symptoms of infection  - Monitor lab/diagnostic results  - Monitor all insertion sites, i.e. indwelling lines, tubes, and drains  - Monitor endotracheal if appropriate and nasal secretions for changes in amount and color  - Levels appropriate cooling/warming therapies per order  - Administer medications as ordered  - Instruct and encourage patient and family to use good hand hygiene technique  - Identify and instruct in appropriate isolation precautions for  identified infection/condition  Outcome: Progressing     Problem: SAFETY ADULT  Goal: Patient will remain free of falls  Description: INTERVENTIONS:  - Educate patient/family on patient safety including physical limitations  - Instruct patient to call for assistance with activity   - Consult OT/PT to assist with strengthening/mobility   - Keep Call bell within reach  - Keep bed low and locked with side rails adjusted as appropriate  - Keep care items and personal belongings within reach  - Initiate and maintain comfort rounds  - Make Fall Risk Sign visible to staff  - Offer Toileting every 2 Hours, in advance of need  - Initiate/Maintain bed/chair alarm  - Apply yellow socks and bracelet for high fall risk patients  - Consider moving patient to room near nurses station  Outcome: Progressing  Goal: Maintain or return to baseline ADL function  Description: INTERVENTIONS:  -  Assess patient's ability to carry out ADLs; assess patient's baseline for ADL function and identify physical deficits which impact ability to perform ADLs (bathing, care of mouth/teeth, toileting, grooming, dressing, etc.)  - Assess/evaluate cause of self-care deficits   - Assess range of motion  - Assess patient's mobility; develop plan if impaired  - Assess patient's need for assistive devices and provide as appropriate  - Encourage maximum independence but intervene and supervise when necessary  - Involve family in performance of ADLs  - Assess for home care needs following discharge   - Consider OT consult to assist with ADL evaluation and planning for discharge  - Provide patient education as appropriate  Outcome: Progressing  Goal: Maintains/Returns to pre admission functional level  Description: INTERVENTIONS:  - Perform AM-PAC 6 Click Basic Mobility/ Daily Activity assessment daily.  - Set and communicate daily mobility goal to care team and patient/family/caregiver.   - Collaborate with rehabilitation services on mobility goals if  consulted  - Reposition patient every 2 hours.  - Dangle patient 2 times a day  - Out of bed for meals 2 times a day  - Out of bed for toileting  - Record patient progress and toleration of activity level   Outcome: Progressing     Problem: DISCHARGE PLANNING  Goal: Discharge to home or other facility with appropriate resources  Description: INTERVENTIONS:  - Identify barriers to discharge w/patient and caregiver  - Arrange for needed discharge resources and transportation as appropriate  - Identify discharge learning needs (meds, wound care, etc.)  - Arrange for interpretive services to assist at discharge as needed  - Refer to Case Management Department for coordinating discharge planning if the patient needs post-hospital services based on physician/advanced practitioner order or complex needs related to functional status, cognitive ability, or social support system  Outcome: Progressing     Problem: Knowledge Deficit  Goal: Patient/family/caregiver demonstrates understanding of disease process, treatment plan, medications, and discharge instructions  Description: Complete learning assessment and assess knowledge base.  Interventions:  - Provide teaching at level of understanding  - Provide teaching via preferred learning methods  Outcome: Progressing     Problem: SKIN/TISSUE INTEGRITY - ADULT  Goal: Incision healing without S/S of infection  Description: INTERVENTIONS  - Assess and document dressing, incision, wound bed, drain sites and surrounding tissue  - Provide patient and family education  - Perform skin care/dressing changes per order  Outcome: Progressing     Problem: MUSCULOSKELETAL - ADULT  Goal: Maintain proper alignment of affected body part  Description: INTERVENTIONS:  - Support, maintain and protect limb and body alignment  - Provide patient/ family with appropriate education  Outcome: Progressing

## 2024-02-13 NOTE — PROGRESS NOTES
Progress Note - Orthopedics   Tonie Figueroa 33 y.o. female MRN: [id number]  Unit/Bed#: -01 Encounter: [id number]    Assessment:  33 year old female POD #1 s/p right tibial IM nail with Dr. Jolley after sustaining a comminuted tibial shaft fracture while skiing.    Plan:  Up and out of bed weightbearing as tolerated on the right lower extremity in the posterior splint with the postop shoe. Patient informed not to remove splint/dressings, but they need to remain intact until seen in follow up outpatient in 10-14 days.  PT/OT  Continue DVT prophylaxis with ASA 81 mg BID for 4 weeks. Patient verbalized understanding.  Postop antibiotics with Ancef completed.  Continue multimodal pain control per primary  Elevate the RLE with pillows or a rolled towel under the ankle or calf (not the right heel) for 20-30 minutes 3-5 times per day.   Patient noted to have acute blood loss anemia due to a drop in Hbg of 2.0g from preop levels, will monitor vital signs and resuscitate with IV fluids as needed  Dispo: Ortho will continue to follow while inpatient  Patient informed she MUST follow-up with home orthopedic surgeon in 10 to 14 days. Patient states this will not be a problem.     Weight bearing: as tolerated RLE    VTE Pharmacologic Prophylaxis: ambulation and ASA 81 mg BID x 4 weeks  VTE Mechanical Prophylaxis: sequential compression device    Subjective: Patient is sitting in her chair resting comfortably. She denies any notable  pain at rest.  She states she was up ambulating with the walker and she did have pain at that time in her knee and along the tibia. She states her pain is controlled with medications. She did note some decreased sensation along the top of her foot but unsure as to when this started. She is tolerating PO. She denies any fevers, chills, night sweats, chest pain or SOB. Patient states the plan is to be discharged home tomorrow.    Vitals: Blood pressure 111/62, pulse (!) 54, temperature 97.8 °F  "(36.6 °C), resp. rate 16, height 5' 4\" (1.626 m), weight 52.2 kg (115 lb), last menstrual period 02/09/2024, SpO2 100%.,Body mass index is 19.74 kg/m².      Intake/Output Summary (Last 24 hours) at 2/13/2024 1338  Last data filed at 2/13/2024 1211  Gross per 24 hour   Intake 2709.6 ml   Output 2250 ml   Net 459.6 ml       Invasive Devices       Peripheral Intravenous Line  Duration             Peripheral IV 02/11/24 Left;Proximal;Ventral (anterior) Forearm 1 day                  Physical Exam: General appearance: alert and oriented, in no acute distress  Head: Normocephalic, without obvious abnormality, atraumatic  Skin: Skin color, texture, turgor normal. No rashes or lesions  Ortho Exam: Right lower extremity  Posterior splint and postop shoe in place. Dressings are clean, dry and intact.   Sensation intact to light touch along sp/tib/jairo/saph nerve distributions. Very mild decrease in sensation to light touch along dpn distribution.    Intact EHL and FHL  Compartments are soft, compressible. No pain with passive stretch.   Toes are mobile and well perfused  2+ DP pulse.     Lab, Imaging and other studies: I have personally reviewed pertinent lab results.  CBC:   Lab Results   Component Value Date    WBC 11.06 (H) 02/12/2024    HGB 10.8 (L) 02/12/2024    HCT 32.2 (L) 02/12/2024    MCV 89 02/12/2024     02/12/2024    RBC 3.61 (L) 02/12/2024    MCH 29.9 02/12/2024    MCHC 33.5 02/12/2024    RDW 13.3 02/12/2024    MPV 8.9 02/12/2024    NRBC 0 02/12/2024     CMP:   Lab Results   Component Value Date    SODIUM 138 02/13/2024     02/13/2024    CO2 23 02/13/2024    BUN 8 02/13/2024    CREATININE 0.60 02/13/2024    CALCIUM 8.1 (L) 02/13/2024    EGFR 120 02/13/2024     PT/INR: No results found for: \"PT\", \"INR\"  ESR: No results found for: \"ESR\"  CRP: No results found for: \"CRP\"      "

## 2024-02-14 VITALS
HEIGHT: 64 IN | OXYGEN SATURATION: 98 % | HEART RATE: 59 BPM | RESPIRATION RATE: 18 BRPM | SYSTOLIC BLOOD PRESSURE: 118 MMHG | BODY MASS INDEX: 19.63 KG/M2 | WEIGHT: 115 LBS | TEMPERATURE: 97.8 F | DIASTOLIC BLOOD PRESSURE: 65 MMHG

## 2024-02-14 PROBLEM — D62 ACUTE BLOOD LOSS ANEMIA: Status: ACTIVE | Noted: 2024-02-12

## 2024-02-14 LAB
ANION GAP SERPL CALCULATED.3IONS-SCNC: 6 MMOL/L
BUN SERPL-MCNC: 9 MG/DL (ref 5–25)
CALCIUM SERPL-MCNC: 8 MG/DL (ref 8.4–10.2)
CHLORIDE SERPL-SCNC: 107 MMOL/L (ref 96–108)
CO2 SERPL-SCNC: 26 MMOL/L (ref 21–32)
CREAT SERPL-MCNC: 0.64 MG/DL (ref 0.6–1.3)
DME PARACHUTE DELIVERY DATE ACTUAL: NORMAL
DME PARACHUTE DELIVERY DATE ACTUAL: NORMAL
DME PARACHUTE DELIVERY DATE REQUESTED: NORMAL
DME PARACHUTE DELIVERY DATE REQUESTED: NORMAL
DME PARACHUTE DELIVERY NOTE: NORMAL
DME PARACHUTE DELIVERY NOTE: NORMAL
DME PARACHUTE ITEM DESCRIPTION: NORMAL
DME PARACHUTE ITEM DESCRIPTION: NORMAL
DME PARACHUTE ORDER STATUS: NORMAL
DME PARACHUTE ORDER STATUS: NORMAL
DME PARACHUTE SUPPLIER NAME: NORMAL
DME PARACHUTE SUPPLIER NAME: NORMAL
DME PARACHUTE SUPPLIER PHONE: NORMAL
DME PARACHUTE SUPPLIER PHONE: NORMAL
ERYTHROCYTE [DISTWIDTH] IN BLOOD BY AUTOMATED COUNT: 13.4 % (ref 11.6–15.1)
GFR SERPL CREATININE-BSD FRML MDRD: 118 ML/MIN/1.73SQ M
GLUCOSE SERPL-MCNC: 101 MG/DL (ref 65–140)
HCT VFR BLD AUTO: 25.9 % (ref 34.8–46.1)
HGB BLD-MCNC: 8.9 G/DL (ref 11.5–15.4)
MCH RBC QN AUTO: 30.4 PG (ref 26.8–34.3)
MCHC RBC AUTO-ENTMCNC: 34.4 G/DL (ref 31.4–37.4)
MCV RBC AUTO: 88 FL (ref 82–98)
PLATELET # BLD AUTO: 234 THOUSANDS/UL (ref 149–390)
PMV BLD AUTO: 8.9 FL (ref 8.9–12.7)
POTASSIUM SERPL-SCNC: 3.8 MMOL/L (ref 3.5–5.3)
RBC # BLD AUTO: 2.93 MILLION/UL (ref 3.81–5.12)
SODIUM SERPL-SCNC: 139 MMOL/L (ref 135–147)
WBC # BLD AUTO: 7.43 THOUSAND/UL (ref 4.31–10.16)

## 2024-02-14 PROCEDURE — 97530 THERAPEUTIC ACTIVITIES: CPT

## 2024-02-14 PROCEDURE — 99239 HOSP IP/OBS DSCHRG MGMT >30: CPT | Performed by: INTERNAL MEDICINE

## 2024-02-14 PROCEDURE — 97535 SELF CARE MNGMENT TRAINING: CPT

## 2024-02-14 PROCEDURE — 97116 GAIT TRAINING THERAPY: CPT

## 2024-02-14 PROCEDURE — 80048 BASIC METABOLIC PNL TOTAL CA: CPT

## 2024-02-14 PROCEDURE — 97110 THERAPEUTIC EXERCISES: CPT

## 2024-02-14 PROCEDURE — 85027 COMPLETE CBC AUTOMATED: CPT | Performed by: INTERNAL MEDICINE

## 2024-02-14 RX ORDER — SENNOSIDES 8.6 MG
8.6 TABLET ORAL DAILY
Qty: 30 TABLET | Refills: 0 | Status: SHIPPED | OUTPATIENT
Start: 2024-02-15 | End: 2024-03-16

## 2024-02-14 RX ORDER — OXYCODONE HYDROCHLORIDE 5 MG/1
5 TABLET ORAL EVERY 6 HOURS PRN
Qty: 28 TABLET | Refills: 0 | Status: SHIPPED | OUTPATIENT
Start: 2024-02-14 | End: 2024-02-21

## 2024-02-14 RX ADMIN — ACETAMINOPHEN 975 MG: 325 TABLET ORAL at 09:15

## 2024-02-14 RX ADMIN — ACETAMINOPHEN 975 MG: 325 TABLET ORAL at 02:36

## 2024-02-14 RX ADMIN — ASPIRIN 81 MG: 81 TABLET, COATED ORAL at 09:14

## 2024-02-14 RX ADMIN — SENNOSIDES 8.6 MG: 8.6 TABLET, FILM COATED ORAL at 09:14

## 2024-02-14 NOTE — ASSESSMENT & PLAN NOTE
Presented following skiing injury for which she fell and twisted her long with subsequent severe pain  CT RLE (2/11): Oblique diaphyseal fracture of the distal tibia with half shaft width posterolateral displacement. Comminuted fracture of the proximal fibular diaphysis with posterior butterfly fragment. Major distal fragment shows half shaft width anterior displacement. No articular malalignment at the knee, proximal tibiofibular joint, or ankle.   S/P surgical repair of right tibia insertion of intramedullary nail with Dr. Jolley on 2/12/2024  DVT ppx recommended with ASA 81mg BID for 4 weeks  Ambulation in surgical shoe with outpatient follow-up  Outpatient PT/OT recommended and follow-up with Ortho in 10-14 days

## 2024-02-14 NOTE — UTILIZATION REVIEW
NOTIFICATION OF ADMISSION DISCHARGE   This is a Notification of Discharge from [facility]. Please be advised that this patient has been discharge from our facility. Below you will find the admission and discharge date and time including the patient’s disposition.   UTILIZATION REVIEW CONTACT:  Janeth Kowalski  Utilization   Network Utilization Review Department  Phone: [phone] [phone] carefully listen to the prompts. All voicemails are confidential.  Email: [email or web address]     ADMISSION INFORMATION  PRESENTATION DATE: 2/11/2024  6:54 PM  OBERVATION ADMISSION DATE:   INPATIENT ADMISSION DATE: 2/11/24  8:39 PM   DISCHARGE DATE: 2/14/2024  2:37 PM   DISPOSITION:Home with Home Health Care    Network Utilization Review Department  ATTENTION: Please call with any questions or concerns to [phone] and carefully listen to the prompts so that you are directed to the right person. All voicemails are confidential.   For Discharge needs, contact Care Management DC Support Team at [phone] opt. [phone]  Send all requests for admission clinical reviews, approved or denied determinations and any other requests to dedicated fax number below belonging to the campus where the patient is receiving treatment. List of dedicated fax numbers for the Facilities:  FACILITY NAME UR FAX NUMBER   ADMISSION DENIALS (Administrative/Medical Necessity) [phone]   DISCHARGE SUPPORT TEAM ([facility]) [phone]   PARENT CHILD HEALTH (Maternity/NICU/Pediatrics) [phone]   [facility] [phone]   [facility] [phone]   Critical access hospital [phone]   [facility] [phone]   [facility] [phone]   [facility] [phone]   [facility] [phone]   [facility]  [phone]   [facility] [phone]   [facility] [phone]   [facility] [phone]   [facility] [phone]

## 2024-02-14 NOTE — DISCHARGE SUMMARY
[facility]  Discharge- Tonie Figueroa 1990, 33 y.o. female MRN: [id number]  Unit/Bed#: -Saroj Encounter: [id number]  Primary Care Provider: No primary care provider on file.   Date and time admitted to hospital: 2/11/2024  6:54 PM    * Tibia/fibula fracture, right, closed, initial encounter  Assessment & Plan  Presented following skiing injury for which she fell and twisted her long with subsequent severe pain  CT RLE (2/11): Oblique diaphyseal fracture of the distal tibia with half shaft width posterolateral displacement. Comminuted fracture of the proximal fibular diaphysis with posterior butterfly fragment. Major distal fragment shows half shaft width anterior displacement. No articular malalignment at the knee, proximal tibiofibular joint, or ankle.   S/P surgical repair of right tibia insertion of intramedullary nail with Dr. Jolley on 2/12/2024  DVT ppx recommended with ASA 81mg BID for 4 weeks  Ambulation in surgical shoe with outpatient follow-up  Outpatient PT/OT recommended and follow-up with Ortho in 10-14 days    Leukemoid reaction  Assessment & Plan  Resolved- Possibly reactive in nature due to fracture/surgical intervention vs. Hemoconcentration  No infectious etiology identified  Remains stable off abx therapy    Acute blood loss anemia  Assessment & Plan  2G drop in Hgb post-operatively  No active bleeding noted      Medical Problems       Resolved Problems  Date Reviewed: 2/11/2024   None       Discharging Physician / Practitioner: Jessica Peguero DO  PCP: No primary care provider on file.  Admission Date:   Admission Orders (From admission, onward)       Ordered        02/11/24 2039  INPATIENT ADMISSION  Once                          Discharge Date: 02/14/24    Consultations During Hospital Stay:  Orthopedic Surgery, PT/OT    Procedures Performed:   S/P surgical repair of right tibia insertion of intramedullary nail with Dr. Jolley on 2/12/2024    Significant  Findings / Test Results:   CT RLE (2/11): Oblique diaphyseal fracture of the distal tibia with half shaft width posterolateral displacement. Comminuted fracture of the proximal fibular diaphysis with posterior butterfly fragment. Major distal fragment shows half shaft width anterior displacement. No articular malalignment at the knee, proximal tibiofibular joint, or ankle.     Incidental Findings:   None     Test Results Pending at Discharge (will require follow up):   None     Outpatient Tests Requested:  To be determined upon outpatient follow-up with PCP and Orthopedic Surgery    Complications:  None    Reason for Admission: Right Tib/Fib Fracture    Hospital Course:   Tonie Figueroa is a 33 y.o. female patient who originally presented to the hospital on 2/11/2024 following a skiing injury. At the time of arrival she noted sever pain of the right lower extremity and CT demonstrated displaced fracture of the distal tibia and proximal/distal fibula. She was admitted to the medical floor and underwent surgical repair with intramedullary nail insertion with Dr. Jolley on 2/12/2024. She did develop mild post-operative blood loss anemia but otherwise tolerated her intervention well. Prior to discharge she was evaluated by PT/OT who recommended outpatient therapy with weight bearing as tolerated in surgical shoe. The patient was discharged with ASA 81mg twice daily (advised to take this for a total of 4 weeks) for dvt prophylaxis. She was discharged in stable condition and all of her questions and her 's questions were answered prior to departure. This is a brief discharge summary. Please see full medical record for more details.     Please see above list of diagnoses and related plan for additional information.     Condition at Discharge: good    Discharge Day Visit / Exam:   Subjective:  Patient sitting up in bedside chair without any acute complaints. No significant over night events reported by nursing.  "    Vitals: Blood Pressure: 118/65 (02/14/24 0658)  Pulse: 59 (02/14/24 0658)  Temperature: 97.8 °F (36.6 °C) (02/14/24 0658)  Temp Source: Oral (02/12/24 2255)  Respirations: 18 (02/13/24 2213)  Height: 5' 4\" (162.6 cm) (02/12/24 1334)  Weight - Scale: 52.2 kg (115 lb) (02/12/24 1334)  SpO2: 98 % (02/14/24 0658)    Exam:   Physical Exam  Vitals and nursing note reviewed.   Constitutional:       General: She is not in acute distress.     Appearance: She is normal weight.   HENT:      Mouth/Throat:      Mouth: Mucous membranes are moist.      Pharynx: Oropharynx is clear.   Cardiovascular:      Rate and Rhythm: Normal rate and regular rhythm.      Pulses: Normal pulses.      Heart sounds: Normal heart sounds.   Musculoskeletal:      Comments: RLE in surgical dressing and shoe   Skin:     General: Skin is warm and dry.   Neurological:      General: No focal deficit present.      Mental Status: She is alert. Mental status is at baseline.   Psychiatric:         Mood and Affect: Mood normal.         Behavior: Behavior normal.          Discussion with Family: Updated  () at bedside.    Discharge instructions/Information to patient and family:   See after visit summary for information provided to patient and family.      Provisions for Follow-Up Care:  See after visit summary for information related to follow-up care and any pertinent home health orders.      Mobility at time of Discharge:   Basic Mobility Inpatient Raw Score: 18  JH-HLM Goal: 6: Walk 10 steps or more  JH-HLM Achieved: 7: Walk 25 feet or more  HLM Goal achieved. Continue to encourage appropriate mobility.     Disposition:   Home    Planned Readmission: None     Discharge Statement:  I spent > 35 minutes discharging the patient. This time was spent on the day of discharge. I had direct contact with the patient on the day of discharge. Greater than 50% of the total time was spent examining patient, answering all patient questions, " arranging and discussing plan of care with patient as well as directly providing post-discharge instructions.  Additional time then spent on discharge activities.    Discharge Medications:  See after visit summary for reconciled discharge medications provided to patient and/or family.      **Please Note: This note may have been constructed using a voice recognition system**

## 2024-02-14 NOTE — CASE MANAGEMENT
Case Management Discharge Planning Note    Patient name Tonie Figueroa  Location /-01 MRN [id number]  : 1990 Date 2024       Current Admission Date: 2024  Current Admission Diagnosis:Tibia/fibula fracture, right, closed, initial encounter   Patient Active Problem List    Diagnosis Date Noted    Acute blood loss anemia 2024    Tibia/fibula fracture, right, closed, initial encounter 2024    Leukemoid reaction 2024      LOS (days): 3  Geometric Mean LOS (GMLOS) (days): 4.2  Days to GMLOS:1.5     OBJECTIVE:  Risk of Unplanned Readmission Score: 9.53         Current admission status: Inpatient   Preferred Pharmacy:   [id number] [address]  [address] [facility] [address]  Phone: [phone] Fax: [phone]    Primary Care Provider: No primary care provider on file.    Primary Insurance: [employer]  Secondary Insurance:     DISCHARGE DETAILS:    Discharge planning discussed with:: patient & spouse at the bedside  Freedom of Choice: Yes  Comments - Freedom of Choice: recommendation outpt rehab - rx was given- dme companies were reviewed [facility] pt - [employer] the pt's choice dme was ordered via parachute  CM contacted family/caregiver?: Yes  Were Treatment Team discharge recommendations reviewed with patient/caregiver?: Yes  Did patient/caregiver verbalize understanding of patient care needs?: Yes  Were patient/caregiver advised of the risks associated with not following Treatment Team discharge recommendations?: Yes    Contacts  Patient Contacts: Kranthi Loza  Relationship to Patient:: Family (spouse)  Contact Method: In Person  Reason/Outcome: Discharge Planning    Requested Home Health Care         Is the patient interested in HHC at discharge?: No    DME Referral Provided  Referral made for DME?: Yes  DME referral completed for the following items:: Bedside Eduardo Otero  DME Supplier Name:: [employer]    Other  Referral/Resources/Interventions Provided:  Interventions: Outpatient OT, Outpatient PT, DME  Referral Comments: rx was given for outpt rehab- cm [facility] pt was made aware she has a $4.83 copay for herminia walker & $4.43 copay for the commode- she was made aware a shower chair is a self pay item    Would you like to participate in our [facility] service program?  : No - Declined    Treatment Team Recommendation: Home (home with spouse & outpt rehab - spouse)  Discharge Destination Plan:: Home (home with spouse & outpt rehab- spouse)  Transport at Discharge : Automobile, Family      Pt & family are in agreement with the d/c & d/c plan                 Accompanied by: Family member           Family notified:: spouse at the bedside

## 2024-02-14 NOTE — PLAN OF CARE
Problem: PHYSICAL THERAPY ADULT  Goal: Performs mobility at highest level of function for planned discharge setting.  See evaluation for individualized goals.  Description: Treatment/Interventions: Functional transfer training, LE strengthening/ROM, Elevations, Therapeutic exercise, Endurance training, Patient/family training, Equipment eval/education, Bed mobility, Gait training, Spoke to nursing, Spoke to case management, Spoke to MD  Equipment Recommended: Walker       See flowsheet documentation for full assessment, interventions and recommendations.  Outcome: Progressing  Note: Prognosis: Good  Problem List: Decreased strength, Decreased range of motion, Decreased endurance, Impaired balance, Decreased mobility, Pain, Orthopedic restrictions, Decreased skin integrity  Assessment: Pt seen for PT treatment session this date with interventions consisting of gait training to advance toward previous level of function w/ emphasis on improving pt's ability to ambulate level surfaces x 35 feet x 2 with close S provided by therapist with RW and therapeutic activity to reduce fall risk and preserve skin integrity consisting of training: supine<>sit transfers, sit<>stand transfers, static sitting tolerance at EOB for >5 minutes w/ unilateral UE support, static standing tolerance for 3 minutes w/ B UE support, vc and tactile cues for static sitting posture faciliation, vc and tactile cues for static standing posture faciliation, stand pivot transfers towards B direction, and education, toileting. Pt agreeable to PT treatment session upon arrival, pt found supine in bed w/ HOB elevated, A&O x 4 . In comparison to previous session, pt with improvements in ambulatory distance, assistance levels,pain . Post session: pt returned back to recliner, chair alarm engaged, all needs in reach, and RN notified of session findings/recommendations. Continued recommendation of minimal resource intensity  at time of d/c in order to  maximize pt's functional independence and safety w/ mobility. Pt continues to be functioning below baseline level, and remains limited 2* factors listed above and including weakness, gait deviations, impaired balance. PT will continue to see pt during current hospitalization in order to address the deficits listed above and provide interventions consistent w/ POC in effort to achieve LTGs.  Barriers to Discharge: (S) None    Rehab Resource Intensity Level, PT: III (Minimum Resource Intensity)    See flowsheet documentation for full assessment.

## 2024-02-14 NOTE — NURSING NOTE
Patient discharged to home in stable condition.  All discharge instructions explained to patient prior to discharge with patient verbally stating understanding of same.  Patient transferred to the hospital exit via wheelchair and was escorted there by the RN.

## 2024-02-14 NOTE — PHYSICAL THERAPY NOTE
Physical Therapy Treatment Session Note    Patient's Name: Tonie Figueroa    Admitting Diagnosis  Leg injury [S89.90XA]  Closed displaced oblique fracture of shaft of right tibia, initial encounter [S82.231A]  Closed fracture of right tibia and fibula, initial encounter [S82.201A, S82.401A]    Problem List  Patient Active Problem List   Diagnosis    Tibia/fibula fracture, right, closed, initial encounter    Leukemoid reaction    Anemia       Past Medical History  History reviewed. No pertinent past medical history.    Past Surgical History  Past Surgical History:   Procedure Laterality Date    SD TX TIBL SHFT FX IMED IMPLT W/WO SCREWS&/CERCLA Right 2/12/2024    Procedure: INSERTION NAIL IM TIBIA;  Surgeon: Jun Jolley DO;  Location: [facility];  Service: Orthopedics        02/14/24 0754   PT Last Visit   PT Visit Date 02/14/24   Note Type   Note Type Treatment   Pain Assessment   Pain Assessment Tool 0-10   Pain Score 5   Pain Location/Orientation Orientation: Right;Location: Knee;Location: Leg   Pain Onset/Description Onset: Ongoing;Frequency: Constant/Continuous;Descriptor: Aching;Descriptor: Sore   Effect of Pain on Daily Activities yes   Patient's Stated Pain Goal No pain   Hospital Pain Intervention(s) Repositioned;Elevated;Emotional support;Environmental changes;Cold applied  (cold pack provided upon conclusion)   Multiple Pain Sites No   Restrictions/Precautions   Weight Bearing Precautions Per Order Yes   RLE Weight Bearing Per Order FWB   Braces or Orthoses Other (Comment)  (R surgical shoe, ACE wrap thigh to foot with splint placed beneath)   Other Precautions Chair Alarm;Bed Alarm;Fall Risk;Pain;WBS   General   Chart Reviewed Yes   Additional Pertinent History All questions answered to the satisfaction of patient and spouse.   Response to Previous Treatment Patient with no complaints from previous session.   Family/Caregiver Present Yes  (spouse)   Cognition   Overall Cognitive Status WFL  "  Arousal/Participation Alert;Cooperative   Attention Within functional limits   Orientation Level Oriented X4   Memory Within functional limits   Following Commands Follows one step commands with increased time or repetition   Comments Tonie was agreeable to PT treatment session, pleasant.   Subjective   Subjective \"I feel better today\"   Bed Mobility   Supine to Sit 4  Minimal assistance  (decreased to CGA with ROM)   Additional items Assist x 1;HOB elevated;Bedrails;Increased time required;Verbal cues;LE management   Sit to Supine   (DNT as Tonie was sitting out of bed on the recliner upon conclusion.)   Additional Comments Verbal cues provided for bedrail utilization and deep breathing for pain modulation. I discussed potential d/c with a leg . OT will complete a treatment session today and provide education.   Transfers   Sit to Stand 5  Supervision   Additional items Assist x 1;Bedrails;Increased time required;Verbal cues   Stand to Sit 5  Supervision   Additional items Assist x 1;Bedrails;Increased time required;Verbal cues   Stand pivot 5  Supervision   Additional items Assist x 1;Increased time required;Verbal cues   Toilet transfer 5  Supervision   Additional items Assist x 1;Increased time required;Verbal cues;Other  (commode placed over standard toilet)   Additional Comments Verbal cues for proper BUE placement with standing transition and RLE extension upon descension for comfort.   Ambulation/Elevation   Gait pattern Improper Weight shift;Decreased R stance;Short stride;Step to;Excessively slow;Decreased heel strike   Gait Assistance 5  Supervision   Additional items Assist x 1;Verbal cues   Assistive Device Rolling walker   Distance 35 feet x 2   Stair Management Assistance Not tested   Ambulation/Elevation Additional Comments Verbal cues for base of support widening and proper AD utilization.   Balance   Static Sitting Normal   Dynamic Sitting Good   Static Standing Good   Dynamic Standing Fair + "   Ambulatory Fair +   Endurance Deficit   Endurance Deficit Yes   Activity Tolerance   Activity Tolerance Patient limited by pain   Medical Staff Made Aware Yes, Dr. Peguero and Padmini SIMPSON were informed of d/c clearance by PT.   Nurse Made Aware Yes, Elizabeth RN was informed of treatment outcome.   Assessment   Prognosis Good   Problem List Decreased strength;Decreased range of motion;Decreased endurance;Impaired balance;Decreased mobility;Pain;Orthopedic restrictions;Decreased skin integrity   Assessment Pt seen for PT treatment session this date with interventions consisting of gait training to advance toward previous level of function w/ emphasis on improving pt's ability to ambulate level surfaces x 35 feet x 2 with close S provided by therapist with RW and therapeutic activity to reduce fall risk and preserve skin integrity consisting of training: supine<>sit transfers, sit<>stand transfers, static sitting tolerance at EOB for >5 minutes w/ unilateral UE support, static standing tolerance for 3 minutes w/ B UE support, vc and tactile cues for static sitting posture faciliation, vc and tactile cues for static standing posture faciliation, stand pivot transfers towards B direction, and education, toileting. Pt agreeable to PT treatment session upon arrival, pt found supine in bed w/ HOB elevated, A&O x 4 . In comparison to previous session, pt with improvements in ambulatory distance, assistance levels,pain . Post session: pt returned back to recliner, chair alarm engaged, all needs in reach, and RN notified of session findings/recommendations. Continued recommendation of minimal resource intensity  at time of d/c in order to maximize pt's functional independence and safety w/ mobility. Pt continues to be functioning below baseline level, and remains limited 2* factors listed above and including weakness, gait deviations, impaired balance. PT will continue to see pt during current hospitalization in order to address  the deficits listed above and provide interventions consistent w/ POC in effort to achieve LTGs.   Barriers to Discharge (S)  None   Goals   Patient Goals to go home today   LTG Expiration Date 02/23/24   Long Term Goal #1 LTGs remain appropriate   PT Treatment Day 1   Plan   Treatment/Interventions Functional transfer training;Elevations;Endurance training;Patient/family training;Equipment eval/education;Bed mobility;Gait training;Spoke to nursing;Spoke to MD;Spoke to case management   Progress Improving as expected   PT Frequency 5-7x/wk   Discharge Recommendation   Rehab Resource Intensity Level, PT III (Minimum Resource Intensity)   Equipment Recommended Walker  (Padmini SIMPSON was aware and to provide prior to d/c.)   Walker Package Recommended Wheeled walker   Additional Comments Upon conclusion, Tonie was sitting out of bed on the recliner. All of her needs were within reach.   AM-PAC Basic Mobility Inpatient   Turning in Flat Bed Without Bedrails 3   Lying on Back to Sitting on Edge of Flat Bed Without Bedrails 3   Moving Bed to Chair 3   Standing Up From Chair Using Arms 3   Walk in Room 3   Climb 3-5 Stairs With Railing 3   Basic Mobility Inpatient Raw Score 18   Basic Mobility Standardized Score 41.05   Highest Level Of Mobility   JH-HLM Goal 6: Walk 10 steps or more   JH-HLM Achieved 7: Walk 25 feet or more     Treatment Time: 3016-4316    Leatha Mendoza, PT

## 2024-02-14 NOTE — ASSESSMENT & PLAN NOTE
Resolved- Possibly reactive in nature due to fracture/surgical intervention vs. Hemoconcentration  No infectious etiology identified  Remains stable off abx therapy

## 2024-02-14 NOTE — PLAN OF CARE
Problem: OCCUPATIONAL THERAPY ADULT  Goal: Performs self-care activities at highest level of function for planned discharge setting.  See evaluation for individualized goals.  Description: Treatment Interventions: ADL retraining, Functional transfer training, Endurance training, Patient/family training, Equipment evaluation/education, Compensatory technique education, Energy conservation, Activityengagement    See flowsheet documentation for full assessment, interventions and recommendations.   Outcome: Progressing  Note: Limitation: Decreased ADL status, Decreased Safe judgement during ADL, Decreased endurance, Decreased self-care trans, Decreased high-level ADLs  Prognosis: Good  Assessment: Patient participated in Skilled OT session this date with interventions consisting of ADL re training with the use of correct body mechnaics, safety awareness and fall prevention techniques,  long handle equipment, therapeutic exercise to: increase functional use of BUEs, increase BUE muscle strength , increase postural control, and increase OOB/ sitting tolerance . Patient agreeable to OT treatment session, upon arrival patient was found seated OOB to Recliner. Patient requiring verbal cues for safety, verbal cues for correct technique, and frequent rest periods, and self-care assistance as noted in flow sheet/AM-PAC.   who appears well was present for session.  Patient continues to be functioning below baseline level, occupational performance remains limited secondary to factors listed above and increased risk for falls and injury.  Patient to benefit from continued Occupational Therapy treatment while in the hospital to address deficits as defined above and maximize level of functional independence with ADLs and functional mobility.     Rehab Resource Intensity Level, OT: III (Minimum Resource Intensity)     SUHAIL Cheng

## 2024-02-14 NOTE — PLAN OF CARE
Problem: Prexisting or High Potential for Compromised Skin Integrity  Goal: Skin integrity is maintained or improved  Description: INTERVENTIONS:  - Identify patients at risk for skin breakdown  - Assess and monitor skin integrity  - Assess and monitor nutrition and hydration status  - Monitor labs   - Assess for incontinence   - Turn and reposition patient  - Assist with mobility/ambulation  - Relieve pressure over bony prominences  - Avoid friction and shearing  - Provide appropriate hygiene as needed including keeping skin clean and dry  - Evaluate need for skin moisturizer/barrier cream  - Collaborate with interdisciplinary team   - Patient/family teaching  - Consider wound care consult   Outcome: Progressing     Problem: PAIN - ADULT  Goal: Verbalizes/displays adequate comfort level or baseline comfort level  Description: Interventions:  - Encourage patient to monitor pain and request assistance  - Assess pain using appropriate pain scale  - Administer analgesics based on type and severity of pain and evaluate response  - Implement non-pharmacological measures as appropriate and evaluate response  - Consider cultural and social influences on pain and pain management  - Notify physician/advanced practitioner if interventions unsuccessful or patient reports new pain  Outcome: Progressing     Problem: INFECTION - ADULT  Goal: Absence or prevention of progression during hospitalization  Description: INTERVENTIONS:  - Assess and monitor for signs and symptoms of infection  - Monitor lab/diagnostic results  - Monitor all insertion sites, i.e. indwelling lines, tubes, and drains  - Monitor endotracheal if appropriate and nasal secretions for changes in amount and color  - [employer] appropriate cooling/warming therapies per order  - Administer medications as ordered  - Instruct and encourage patient and family to use good hand hygiene technique  - Identify and instruct in appropriate isolation precautions for  identified infection/condition  Outcome: Progressing     Problem: SAFETY ADULT  Goal: Patient will remain free of falls  Description: INTERVENTIONS:  - Educate patient/family on patient safety including physical limitations  - Instruct patient to call for assistance with activity   - Consult OT/PT to assist with strengthening/mobility   - Keep Call bell within reach  - Keep bed low and locked with side rails adjusted as appropriate  - Keep care items and personal belongings within reach  - Initiate and maintain comfort rounds  - Make Fall Risk Sign visible to staff  - Offer Toileting every 2 Hours, in advance of need  - Initiate/Maintain bed/chair alarm  - Apply yellow socks and bracelet for high fall risk patients  - Consider moving patient to room near nurses station  Outcome: Progressing  Goal: Maintain or return to baseline ADL function  Description: INTERVENTIONS:  -  Assess patient's ability to carry out ADLs; assess patient's baseline for ADL function and identify physical deficits which impact ability to perform ADLs (bathing, care of mouth/teeth, toileting, grooming, dressing, etc.)  - Assess/evaluate cause of self-care deficits   - Assess range of motion  - Assess patient's mobility; develop plan if impaired  - Assess patient's need for assistive devices and provide as appropriate  - Encourage maximum independence but intervene and supervise when necessary  - Involve family in performance of ADLs  - Assess for home care needs following discharge   - Consider OT consult to assist with ADL evaluation and planning for discharge  - Provide patient education as appropriate  Outcome: Progressing  Goal: Maintains/Returns to pre admission functional level  Description: INTERVENTIONS:  - Perform AM-PAC 6 Click Basic Mobility/ Daily Activity assessment daily.  - Set and communicate daily mobility goal to care team and patient/family/caregiver.   - Collaborate with rehabilitation services on mobility goals if  consulted  - Reposition patient every 2 hours.  - Dangle patient 2 times a day  - Out of bed for meals 2 times a day  - Out of bed for toileting  - Record patient progress and toleration of activity level   Outcome: Progressing     Problem: DISCHARGE PLANNING  Goal: Discharge to home or other facility with appropriate resources  Description: INTERVENTIONS:  - Identify barriers to discharge w/patient and caregiver  - Arrange for needed discharge resources and transportation as appropriate  - Identify discharge learning needs (meds, wound care, etc.)  - Arrange for interpretive services to assist at discharge as needed  - Refer to Case Management Department for coordinating discharge planning if the patient needs post-hospital services based on physician/advanced practitioner order or complex needs related to functional status, cognitive ability, or social support system  Outcome: Progressing     Problem: Knowledge Deficit  Goal: Patient/family/caregiver demonstrates understanding of disease process, treatment plan, medications, and discharge instructions  Description: Complete learning assessment and assess knowledge base.  Interventions:  - Provide teaching at level of understanding  - Provide teaching via preferred learning methods  Outcome: Progressing     Problem: SKIN/TISSUE INTEGRITY - ADULT  Goal: Incision healing without S/S of infection  Description: INTERVENTIONS  - Assess and document dressing, incision, wound bed, drain sites and surrounding tissue  - Provide patient and family education  - Perform skin care/dressing changes per order  Outcome: Progressing     Problem: MUSCULOSKELETAL - ADULT  Goal: Maintain proper alignment of affected body part  Description: INTERVENTIONS:  - Support, maintain and protect limb and body alignment  - Provide patient/ family with appropriate education  Outcome: Progressing

## 2024-02-14 NOTE — OCCUPATIONAL THERAPY NOTE
"   02/14/24 0933   OT Last Visit   OT Visit Date 02/14/24   Note Type   Note Type Treatment  (completed [id number])   Pain Assessment   Pain Assessment Tool 0-10   Pain Score 5   Lifestyle   Reciprocal Relationships  present for most of session   Intrinsic Gratification reading, gym/wall climbing, \"Now I'll  my guitar (during recuperation)\"   ADL   Where Assessed   (patient asked to bathe while in recliner, post session > declined any assistance, except for set-up)   Equipment Provided   (provided instruction and functional demos. in all LH/LB AE > patient attentive throughout - she will consider these and acquire them via online shopping if desired)   LB Dressing Comments provided education for safety and ease of LB dressing   Therapeutic Excerise-Strength   UE Strength Yes  (provided education and brief practice in UE exercise)   Right Upper Extremity- Strength   R Shoulder Flexion;Horizontal ABduction;Other (Comment)  (horizontal adduction;  Pro/re-traction)   R Elbow Elbow flexion;Elbow extension  (in forward and reverse; and supin/pron-ation)   R Weight/Reps/Sets 2 pound weight - 5 reps each exercise   Left Upper Extremity-Strength   L Shoulder Flexion;Horizontal ABduction  (horizontal adduction)   L Weights/Reps/Sets 2 pound weight - 5 reps each exercise   LUE Strength Comment *other exercises deferred secondary to discomfort at IV at L elbow crease   Coordination   Gross Motor WFL   Dexterity WFL   Subjective   Subjective \"I'm so glad you told me these things - I wouldn't have known\".   Cognition   Overall Cognitive Status WFL   Arousal/Participation Alert;Cooperative   Attention Within functional limits   Orientation Level Oriented X4   Memory Within functional limits   Following Commands Follows one step commands with increased time or repetition   Activity Tolerance   Activity Tolerance Patient limited by fatigue;Patient limited by pain   Medical Staff Made Aware nurse Elizabeth aware of session "   Assessment   Assessment Patient participated in Skilled OT session this date with interventions consisting of ADL re training with the use of correct body mechnaics, safety awareness and fall prevention techniques,  long handle equipment, therapeutic exercise to: increase functional use of BUEs, increase BUE muscle strength , increase postural control, and increase OOB/ sitting tolerance . Patient agreeable to OT treatment session, upon arrival patient was found seated OOB to Recliner. Patient requiring verbal cues for safety, verbal cues for correct technique, and frequent rest periods, and self-care assistance as noted in flow sheet/-PAC.   who appears well was present for session.  Patient continues to be functioning below baseline level, occupational performance remains limited secondary to factors listed above and increased risk for falls and injury.  Patient to benefit from continued Occupational Therapy treatment while in the hospital to address deficits as defined above and maximize level of functional independence with ADLs and functional mobility.   Plan   Treatment Interventions ADL retraining;UE strengthening/ROM;Patient/family training;Equipment evaluation/education;Compensatory technique education;Activityengagement   Goal Expiration Date 02/23/24   OT Treatment Day 1   Discharge Recommendation   Rehab Resource Intensity Level, OT III (Minimum Resource Intensity)   Additional Comments 2 The patient's raw score on the AM-PAC Daily Activity Inpatient Short Form is 19. A raw score of greater than or equal to 19 suggests the patient may benefit from discharge to home. Please refer to the recommendation of the Occupational Therapist for safe discharge planning.   AM-PAC Daily Activity Inpatient   Lower Body Dressing 2   Bathing 3   Toileting 3   Upper Body Dressing 3   Grooming 4   Eating 4   Daily Activity Raw Score 19   Daily Activity Standardized Score (Calc for Raw Score >=11) 40.22   -PAC  Applied Cognition Inpatient   Following a Speech/Presentation 4   Understanding Ordinary Conversation 4   Taking Medications 4   Remembering Where Things Are Placed or Put Away 4   Remembering List of 4-5 Errands 4   Taking Care of Complicated Tasks 4   Applied Cognition Raw Score 24   Applied Cognition Standardized Score 62.21       SEVERINO Cheng/L

## 2024-02-14 NOTE — OCCUPATIONAL THERAPY NOTE
"   02/14/24 0933   OT Last Visit   OT Visit Date 02/14/24   Note Type   Note Type Treatment  (completed [id number])   Pain Assessment   Pain Assessment Tool 0-10   Pain Score 5   Lifestyle   Reciprocal Relationships  present for most of session   Intrinsic Gratification reading, gym/wall climbing, \"Now I'll  my guitar (during recuperation)\"   ADL   Where Assessed   (patient asked to bathe while in recliner, post session > declined any assistance, except for set-up)   Equipment Provided   (provided instruction and functional demos. in all LH/LB AE > patient attentive throughout - she will consider these and acquire them via online shopping if desired)   LB Dressing Comments provided education for safety and ease of LB dressing   Therapeutic Excerise-Strength   UE Strength Yes  (provided education and brief practice in UE exercise)   Right Upper Extremity- Strength   R Shoulder Flexion;Horizontal ABduction;Other (Comment)  (horizontal adduction;  Pro/re-traction)   R Elbow Elbow flexion;Elbow extension  (in forward and reverse; and supin/pron-ation)   R Weight/Reps/Sets 2 pound weight - 5 reps each exercise   Left Upper Extremity-Strength   L Shoulder Flexion;Horizontal ABduction  (horizontal adduction)   L Weights/Reps/Sets 2 pound weight - 5 reps each exercise   LUE Strength Comment *other exercises deferred secondary to discomfort at IV at L elbow crease   Coordination   Gross Motor WFL   Dexterity WFL   Subjective   Subjective \"I'm so glad you told me these things - I wouldn't have known\".   Cognition   Overall Cognitive Status WFL   Arousal/Participation Alert;Cooperative   Attention Within functional limits   Orientation Level Oriented X4   Memory Within functional limits   Following Commands Follows one step commands with increased time or repetition   Activity Tolerance   Activity Tolerance Patient limited by fatigue;Patient limited by pain   Medical Staff Made Aware nurse Elizabeth aware of session "   Assessment   Assessment Patient participated in Skilled OT session this date with interventions consisting of ADL re training with the use of correct body mechnaics, safety awareness and fall prevention techniques,  long handle equipment, therapeutic exercise to: increase functional use of BUEs, increase BUE muscle strength , increase postural control, and increase OOB/ sitting tolerance . Patient agreeable to OT treatment session, upon arrival patient was found seated OOB to Recliner. Patient requiring verbal cues for safety, verbal cues for correct technique, and frequent rest periods, and self-care assistance as noted in flow sheet/-PAC.   who appears well was present for session.  Patient continues to be functioning below baseline level, occupational performance remains limited secondary to factors listed above and increased risk for falls and injury.  Patient to benefit from continued Occupational Therapy treatment while in the hospital to address deficits as defined above and maximize level of functional independence with ADLs and functional mobility.   Plan   Treatment Interventions ADL retraining;UE strengthening/ROM;Patient/family training;Equipment evaluation/education;Compensatory technique education;Activityengagement   Goal Expiration Date 02/23/24   OT Treatment Day 1   Discharge Recommendation   Rehab Resource Intensity Level, OT III (Minimum Resource Intensity)   Additional Comments 2 The patient's raw score on the AM-PAC Daily Activity Inpatient Short Form is 19. A raw score of greater than or equal to 19 suggests the patient may benefit from discharge to home. Please refer to the recommendation of the Occupational Therapist for safe discharge planning.   AM-PAC Daily Activity Inpatient   Lower Body Dressing 2   Bathing 3   Toileting 3   Upper Body Dressing 3   Grooming 4   Eating 4   Daily Activity Raw Score 19   Daily Activity Standardized Score (Calc for Raw Score >=11) 40.22   -PAC  Applied Cognition Inpatient   Following a Speech/Presentation 4   Understanding Ordinary Conversation 4   Taking Medications 4   Remembering Where Things Are Placed or Put Away 4   Remembering List of 4-5 Errands 4   Taking Care of Complicated Tasks 4   Applied Cognition Raw Score 24   Applied Cognition Standardized Score 62.21       SEVERINO Cheng/L

## (undated) DEVICE — VIOLET BRAIDED (POLYGLACTIN 910), SYNTHETIC ABSORBABLE SUTURE: Brand: COATED VICRYL

## (undated) DEVICE — 2.5MM REAMING ROD WITH BALL TIP/950MM-STERILE

## (undated) DEVICE — DRAPE C-ARM X-RAY

## (undated) DEVICE — CHLORAPREP HI-LITE 26ML ORANGE

## (undated) DEVICE — BETHLEHEM UNIV TOTAL KNEE, KIT: Brand: CARDINAL HEALTH

## (undated) DEVICE — INTENDED FOR TISSUE SEPARATION, AND OTHER PROCEDURES THAT REQUIRE A SHARP SURGICAL BLADE TO PUNCTURE OR CUT.: Brand: BARD-PARKER ® CARBON RIB-BACK BLADES

## (undated) DEVICE — SYRINGE 10ML LL

## (undated) DEVICE — STAPLER SKIN 35 WIDE ULC APPOSE

## (undated) DEVICE — GLOVE SRG BIOGEL 8

## (undated) DEVICE — PADDING CAST 4 IN  COTTON STRL

## (undated) DEVICE — GAUZE SPONGES,16 PLY: Brand: CURITY

## (undated) DEVICE — NEEDLE 21 G X 1 1/2 SAFETY

## (undated) DEVICE — Device: Brand: PULSAVAC®

## (undated) DEVICE — 3M™ STERI-DRAPE™ U-DRAPE 1015: Brand: STERI-DRAPE™

## (undated) DEVICE — DRAPE C-ARMOUR

## (undated) DEVICE — TIBURON SPLIT SHEET: Brand: CONVERTORS

## (undated) DEVICE — ACE WRAP 4 IN STERILE

## (undated) DEVICE — SKIN MARKER DUAL TIP WITH RULER CAP, FLEXIBLE RULER AND LABELS: Brand: DEVON

## (undated) DEVICE — 12.0MM OUTER PROTECTION SLEEVE FOR SUPRAPATELLAR - STERILE

## (undated) DEVICE — ABDOMINAL PAD: Brand: DERMACEA

## (undated) DEVICE — PADDING CAST 6IN COTTON STRL

## (undated) DEVICE — SUT MONOCRYL 2-0 SH 27 IN Y417H

## (undated) DEVICE — KERLIX BANDAGE ROLL: Brand: KERLIX

## (undated) DEVICE — DRAPE,U/ SHT,SPLIT,PLAS,STERIL: Brand: MEDLINE

## (undated) DEVICE — HALF SHEET: Brand: CONVERTORS

## (undated) DEVICE — OCCLUSIVE GAUZE STRIP,3% BISMUTH TRIBROMOPHENATE IN PETROLATUM BLEND: Brand: XEROFORM

## (undated) DEVICE — ACE WRAP 6 IN STERILE

## (undated) DEVICE — ACE WRAP 6 IN XL STERILE

## (undated) DEVICE — SPONGE LAP 18 X 18 IN STRL RFD

## (undated) DEVICE — 4-PORT MANIFOLD: Brand: NEPTUNE 2

## (undated) DEVICE — CURITY NON-ADHERENT STRIPS: Brand: CURITY